# Patient Record
Sex: FEMALE | HISPANIC OR LATINO | Employment: UNEMPLOYED | URBAN - METROPOLITAN AREA
[De-identification: names, ages, dates, MRNs, and addresses within clinical notes are randomized per-mention and may not be internally consistent; named-entity substitution may affect disease eponyms.]

---

## 2017-02-12 ENCOUNTER — TRANSFERRED RECORDS (OUTPATIENT)
Dept: HEALTH INFORMATION MANAGEMENT | Facility: CLINIC | Age: 57
End: 2017-02-12

## 2017-02-12 ENCOUNTER — HOSPITAL ENCOUNTER (EMERGENCY)
Facility: CLINIC | Age: 57
Discharge: ANOTHER HOSPITAL | End: 2017-02-13
Attending: EMERGENCY MEDICINE | Admitting: EMERGENCY MEDICINE
Payer: COMMERCIAL

## 2017-02-12 ENCOUNTER — APPOINTMENT (OUTPATIENT)
Dept: GENERAL RADIOLOGY | Facility: CLINIC | Age: 57
End: 2017-02-12
Attending: EMERGENCY MEDICINE
Payer: COMMERCIAL

## 2017-02-12 DIAGNOSIS — R07.9 CHEST PAIN, UNSPECIFIED TYPE: ICD-10-CM

## 2017-02-12 DIAGNOSIS — R10.84 ABDOMINAL PAIN, GENERALIZED: ICD-10-CM

## 2017-02-12 DIAGNOSIS — R45.851 SUICIDAL IDEATION: ICD-10-CM

## 2017-02-12 LAB
ALBUMIN SERPL-MCNC: 3.7 G/DL (ref 3.4–5)
ALBUMIN UR-MCNC: NEGATIVE MG/DL
ALP SERPL-CCNC: 118 U/L (ref 40–150)
ALT SERPL W P-5'-P-CCNC: 39 U/L (ref 0–50)
AMPHETAMINES UR QL SCN: NORMAL
ANION GAP SERPL CALCULATED.3IONS-SCNC: 9 MMOL/L (ref 3–14)
APAP SERPL-MCNC: NORMAL MG/L (ref 10–20)
APPEARANCE UR: CLEAR
AST SERPL W P-5'-P-CCNC: 29 U/L (ref 0–45)
BARBITURATES UR QL: NORMAL
BASOPHILS # BLD AUTO: 0.1 10E9/L (ref 0–0.2)
BASOPHILS NFR BLD AUTO: 0.7 %
BENZODIAZ UR QL: NORMAL
BILIRUB SERPL-MCNC: 0.6 MG/DL (ref 0.2–1.3)
BILIRUB UR QL STRIP: NEGATIVE
BUN SERPL-MCNC: 8 MG/DL (ref 7–30)
CALCIUM SERPL-MCNC: 8.6 MG/DL (ref 8.5–10.1)
CANNABINOIDS UR QL SCN: NORMAL
CHLORIDE SERPL-SCNC: 106 MMOL/L (ref 94–109)
CO2 SERPL-SCNC: 24 MMOL/L (ref 20–32)
COCAINE UR QL: NORMAL
COLOR UR AUTO: YELLOW
CREAT SERPL-MCNC: 0.54 MG/DL (ref 0.52–1.04)
D DIMER PPP FEU-MCNC: 0.3 UG/ML FEU (ref 0–0.5)
DIFFERENTIAL METHOD BLD: NORMAL
EOSINOPHIL # BLD AUTO: 0.1 10E9/L (ref 0–0.7)
EOSINOPHIL NFR BLD AUTO: 1.2 %
ERYTHROCYTE [DISTWIDTH] IN BLOOD BY AUTOMATED COUNT: 13.4 % (ref 10–15)
ETHANOL SERPL-MCNC: <0.01 G/DL
GFR SERPL CREATININE-BSD FRML MDRD: NORMAL ML/MIN/1.7M2
GLUCOSE SERPL-MCNC: 97 MG/DL (ref 70–99)
GLUCOSE UR STRIP-MCNC: NEGATIVE MG/DL
HCT VFR BLD AUTO: 41.5 % (ref 35–47)
HGB BLD-MCNC: 13.5 G/DL (ref 11.7–15.7)
HGB UR QL STRIP: NEGATIVE
IMM GRANULOCYTES # BLD: 0 10E9/L (ref 0–0.4)
IMM GRANULOCYTES NFR BLD: 0.2 %
KETONES UR STRIP-MCNC: NEGATIVE MG/DL
LEUKOCYTE ESTERASE UR QL STRIP: NEGATIVE
LIPASE SERPL-CCNC: 163 U/L (ref 73–393)
LYMPHOCYTES # BLD AUTO: 2.7 10E9/L (ref 0.8–5.3)
LYMPHOCYTES NFR BLD AUTO: 29.5 %
MCH RBC QN AUTO: 28.6 PG (ref 26.5–33)
MCHC RBC AUTO-ENTMCNC: 32.5 G/DL (ref 31.5–36.5)
MCV RBC AUTO: 88 FL (ref 78–100)
MONOCYTES # BLD AUTO: 0.5 10E9/L (ref 0–1.3)
MONOCYTES NFR BLD AUTO: 5 %
MUCOUS THREADS #/AREA URNS LPF: PRESENT /LPF
NEUTROPHILS # BLD AUTO: 5.7 10E9/L (ref 1.6–8.3)
NEUTROPHILS NFR BLD AUTO: 63.4 %
NITRATE UR QL: NEGATIVE
NRBC # BLD AUTO: 0 10*3/UL
NRBC BLD AUTO-RTO: 0 /100
OPIATES UR QL SCN: NORMAL
PCP UR QL SCN: NORMAL
PH UR STRIP: 6.5 PH (ref 5–7)
PLATELET # BLD AUTO: 266 10E9/L (ref 150–450)
POTASSIUM SERPL-SCNC: 4 MMOL/L (ref 3.4–5.3)
PROT SERPL-MCNC: 7.9 G/DL (ref 6.8–8.8)
RBC # BLD AUTO: 4.72 10E12/L (ref 3.8–5.2)
RBC #/AREA URNS AUTO: 0 /HPF (ref 0–2)
SALICYLATES SERPL-MCNC: NORMAL MG/DL
SODIUM SERPL-SCNC: 139 MMOL/L (ref 133–144)
SP GR UR STRIP: 1.01 (ref 1–1.03)
SQUAMOUS #/AREA URNS AUTO: 1 /HPF (ref 0–1)
TRANS CELLS #/AREA URNS HPF: <1 /HPF (ref 0–1)
TROPONIN I SERPL-MCNC: NORMAL UG/L (ref 0–0.04)
URN SPEC COLLECT METH UR: ABNORMAL
UROBILINOGEN UR STRIP-MCNC: NORMAL MG/DL (ref 0–2)
WBC # BLD AUTO: 9 10E9/L (ref 4–11)
WBC #/AREA URNS AUTO: 1 /HPF (ref 0–2)

## 2017-02-12 PROCEDURE — 96360 HYDRATION IV INFUSION INIT: CPT

## 2017-02-12 PROCEDURE — 80320 DRUG SCREEN QUANTALCOHOLS: CPT | Mod: 59 | Performed by: EMERGENCY MEDICINE

## 2017-02-12 PROCEDURE — 84484 ASSAY OF TROPONIN QUANT: CPT | Performed by: EMERGENCY MEDICINE

## 2017-02-12 PROCEDURE — 93005 ELECTROCARDIOGRAM TRACING: CPT

## 2017-02-12 PROCEDURE — 80329 ANALGESICS NON-OPIOID 1 OR 2: CPT | Mod: 91 | Performed by: EMERGENCY MEDICINE

## 2017-02-12 PROCEDURE — 85379 FIBRIN DEGRADATION QUANT: CPT | Performed by: EMERGENCY MEDICINE

## 2017-02-12 PROCEDURE — 71020 XR CHEST 2 VW: CPT

## 2017-02-12 PROCEDURE — 80053 COMPREHEN METABOLIC PANEL: CPT | Performed by: EMERGENCY MEDICINE

## 2017-02-12 PROCEDURE — 83690 ASSAY OF LIPASE: CPT | Performed by: EMERGENCY MEDICINE

## 2017-02-12 PROCEDURE — 99285 EMERGENCY DEPT VISIT HI MDM: CPT | Mod: 25

## 2017-02-12 PROCEDURE — 96361 HYDRATE IV INFUSION ADD-ON: CPT

## 2017-02-12 PROCEDURE — 90791 PSYCH DIAGNOSTIC EVALUATION: CPT | Performed by: EMERGENCY MEDICINE

## 2017-02-12 PROCEDURE — 80307 DRUG TEST PRSMV CHEM ANLYZR: CPT | Performed by: EMERGENCY MEDICINE

## 2017-02-12 PROCEDURE — 85025 COMPLETE CBC W/AUTO DIFF WBC: CPT | Performed by: EMERGENCY MEDICINE

## 2017-02-12 PROCEDURE — 25000128 H RX IP 250 OP 636: Performed by: EMERGENCY MEDICINE

## 2017-02-12 PROCEDURE — 81001 URINALYSIS AUTO W/SCOPE: CPT | Performed by: EMERGENCY MEDICINE

## 2017-02-12 PROCEDURE — 80329 ANALGESICS NON-OPIOID 1 OR 2: CPT | Performed by: EMERGENCY MEDICINE

## 2017-02-12 RX ADMIN — SODIUM CHLORIDE 1000 ML: 9 INJECTION, SOLUTION INTRAVENOUS at 18:47

## 2017-02-12 NOTE — ED NOTES
Patient sent from [facility] clinic by EMS. Was seen for abdominal pain there last night and then spent the night in the clinic bathroom. Patient continues to have abdominal pain and is complaining of depression and suicidal thoughts to staff at clinic today. Patient is tearful. ABC Intact alert and no distress.   Patient is Colombian speaking.

## 2017-02-12 NOTE — ED NOTES
Bed: ED06  Expected date: 2/12/17  Expected time: 5:29 PM  Means of arrival: Ambulance  Comments:  BSV 1

## 2017-02-12 NOTE — ED PROVIDER NOTES
"  History     Chief Complaint:    Suicidal      HPI History limited secondary to a language barrier. A phone  was used.   Kayla Pierce is a 56 year old female who presents with suicidal ideation and chest/abdominal pain. The patient states that for the last 3 weeks she has had nausea, vomiting, diarrhea, diffuse abdominal pain, and chest pain. She states that she was evaluated at an  yesterday and was told that her work up was unremarkable and her symptoms were not serious. She states that she continued to have diarrhea so bad that she ended up spending the night in the  bathroom. She then presented back to the  for persistent symptoms and was sent to the ED for further evaluation. She also endorses increasing depression recently secondary to going through a divorce with her . She denies doing anything to actually harm herself, but states that she planned to fill her prescription for her anti-depressant yesterday and overdose. However, she was unable to fill the prescription. She also notes that she thought about taking a \"poison\" today, but denies actually ingesting anything. She notes that she has been off of her anti-depressant medication x 1 month because she can not afford it. She denies recent illness, fever, chills, shortness of breath, extremity pain, HI, alcohol use, or drug use. No other concerns or complaints at this time.           Allergies:  Amoxicillin  Ibuprofen      Medications:    Duloxetine hcl (cymbalta po)  Omeprazole (prilosec po)  Sertraline hcl (zoloft po)  Levothyroxine sodium po  Methocarbamol (robaxin) 500 mg tablet  Diclofenac (voltaren) 0.1 % ophthalmic solution  Acetaminophen (tylenol) 325 mg tablet  Ibuprofen (advil,motrin) 200 mg tablet    Past Medical History:    Hypothyroidism   Hearing loss    Past Surgical History:    Cholecystectomy    Family History:    History reviewed. No pertinent family history.     Social History:  Marital Status: "   Presents to the ED Alone  Tobacco Use: Never smoker  Alcohol Use: No  PCP: [facility]     Review of Systems   Constitutional: Negative for chills and fever.   HENT: Negative.    Eyes: Negative.    Respiratory: Negative for shortness of breath.    Cardiovascular: Positive for chest pain. Negative for leg swelling.   Gastrointestinal: Positive for abdominal pain, diarrhea, nausea and vomiting. Negative for blood in stool.   Endocrine: Negative.    Genitourinary: Negative.    Musculoskeletal: Negative.    Neurological: Negative.    Psychiatric/Behavioral: Positive for dysphoric mood and suicidal ideas.         Physical Exam   First Vitals:  BP: 134/74  Pulse: 98  Temp: 98.4  F (36.9  C)  Resp: 20  SpO2: 98 %    Physical Exam  Constitutional: The patient is oriented to person, place, and time. Alert and cooperative.  HENT:   Right Ear: External ear normal.   Left Ear: External ear normal.   Nose: Nose normal.   Mouth/Throat: Uvula is midline, oropharynx is clear and moist and mucous membranes are normal. No posterior oropharyngeal edema or erythema.   Eyes: Conjunctivae, EOM and lids are normal. Pupils are equal, round, and reactive to light.   Neck: Trachea normal. Normal range of motion. Neck supple.   Cardiovascular: Normal rate, regular rhythm, normal heart sounds, and intact distal pulses.    Pulmonary/Chest: Effort normal and breath sounds equal bilaterally. No crackles or wheezing.   Abdominal: Soft. Mild diffuse tenderness with palpation. No rebound and no guarding.   Musculoskeletal: Normal range of motion.  No extremity tenderness or edema.  Neurological: Alert and Oriented. Strength 5/5 in upper and lower extremities bilaterally. Sensation intact to light touch throughout.  Skin: Skin is dry. No rash noted.          Emergency Department Course   ECG:  @ 1834  Indication: chest pain  Vent. Rate 67 bpm. OH interval 146 ms. QRS duration 78 ms. QT/QTc 418/441 ms. P-R-T axis 8 -13 6.    Normal sinus rhythm. Normal ECG.   Read @ 1842 by Dr. Olivares.     Imaging:   X-ray Chest, PA and LAT:  Negative   Preliminary radiology read.    Radiographic findings were communicated with the patient who voiced understanding of the findings.    Laboratory:  UA: Clear yellow urine, mucous present otherwise WNL     Drug abuse screen 77 urine: Amphetamine negative, Barbiturates negative, Benzodiazapine's negative, Cannabinoid negative, Cocaine negative, Opiate negative, PCP negative.        CBC:  WBC 9.0, HGB 13.5, , otherwise WNL   CMP:  WNL (Creatinine 0.54)    Lipase: 163    (1830) Troponin I: <0.015     (1830) D-dimer: 0.3    (1830) Acetaminophen level: < 2    (1830) Salicylate level: <2     (1830) Blood alcohol level: < 0.01    Interventions:  (1847) Normal Saline, 1 liter, IV bolus      Emergency Department Course:  Nursing notes and vitals reviewed.  I performed an exam of the patient as documented above.   EKG was done, interpretation as above.  IV inserted.   Blood was drawn from the patient. This was sent for laboratory testing, findings above.  Urine sample was obtained and sent for laboratory analysis, findings above.   The patient was sent for a chest x-ray while in the emergency department, findings above.    I spoke with Timur from DEC regarding the patient.  Patient will be signed out to my partner Dr. Tan pending transfer to inpatient psychiatry.      Impression & Plan      Medical Decision Making:  Kayla Pierce is a 56 year old female with a history of hypothyroidism who presents to the ED for evaluation of suicidal thoughts and abdominal pain. Upon presentation to the ED, the patient is nontoxic appearing and her vital signs are within normal limits and stable. On exam, she is well appearing. She is alert, oriented, and neuro exam is nonfocal. Cardiopulmonary exam is unremarkable. On abdominal examination, the patient does endorse very mild diffuse tenderness to palpation,  there is no rebound or guarding. The rest of her exam is as mentioned above. With regard to the patient's abdominal and chest pain, she does note that these symptoms have been present for nearly 3 weeks. Per review of records, the patient was evaluated in the urgent care yesterday and did have imaging and labs performed. She did have a CT of the abdomen that demonstrated no evidence of an acute intraabdominal process. Labs were obtained and were overall unremarkable. EKG was obtained here today and demonstrates sinus rhythm. There are no concerning acute ischemic changes. Chest x-ray demonstrates no evidence of an acute cardiopulmomary process. Labs were obtained and are as mentioned above. Notably, her lab evaluation is unremarkable. With regard to the patient's chest pain, given the unremarkable chest x-ray, pneumonia and pneumothorax are unlikely. She is low risk for PE by WELL's criteria with a negative D-dimer. PE is less likely and further evaluation for this is not indicated at this at this time. Given the unremarkable EKG and negative troponin, despite greater than 6 hours of symptoms, this essentially rules out ACS. The patient's history and present are not consistent with aortic dissection, esophageal rupture, or pericarditis, therefore I feel that these diagnoses are less likely.     With regard to her mild diffuse abdominal pain, lab evaluation here is unremarkable. The patient did have a CT of her abdomen obtained yesterday that demonstrated no evidence of an acute intraabdominal process. Therefore I do not feel that this needs to be repeated at this time. With regard to the patients suicidal thoughts, an ingestion workup was performed. This was overall unremarkable. The patient denies doing anything recently to harm herself. Given that the patient does endorse suicidal thoughts, DEC was consulted and they did evaluate the patient. Per report from DEC, the patient's son was contacted and he did note  that over the last month the patient has had several episodes in which she has attempted to hang herself with a scarf. These symptoms have been triggered over recent divorce papers given to the patient by her . Overall, given that the patient does endorse suicidal thoughts, she was placed on an LENCHO and I do feel that inpatient management is indicated. DEC is in agreement with this assessment. The patient was signed out to my colleague, Dr. Tan, pending transfer to inpatient psychiatry. She is medically cleared at this time. She was stable/improved at the time of sign out.       Diagnosis:    ICD-10-CM    1. Suicidal ideation R45.851 UA with Microscopic     Drug abuse screen 77 urine   2. Abdominal pain, generalized R10.84    3. Chest pain, unspecified type R07.9      Disposition:  Sign out to Kailash Peñaloza, am serving as a scribe on 2/12/2017 at 6:27 PM to personally document services performed by Dr. Olivares based on my observations and the provider's statements to me.     2/12/2017   [facility] EMERGENCY DEPARTMENT       Britt Olivares MD  02/13/17 0120

## 2017-02-13 ENCOUNTER — HOSPITAL ENCOUNTER (INPATIENT)
Facility: CLINIC | Age: 57
LOS: 5 days | Discharge: HOME | DRG: 885 | End: 2017-02-18
Attending: PSYCHIATRY & NEUROLOGY | Admitting: PSYCHIATRY & NEUROLOGY
Payer: COMMERCIAL

## 2017-02-13 ENCOUNTER — OFFICE VISIT (OUTPATIENT)
Dept: INTERPRETER SERVICES | Facility: CLINIC | Age: 57
End: 2017-02-13

## 2017-02-13 VITALS
HEART RATE: 74 BPM | SYSTOLIC BLOOD PRESSURE: 114 MMHG | TEMPERATURE: 98.4 F | OXYGEN SATURATION: 100 % | RESPIRATION RATE: 18 BRPM | DIASTOLIC BLOOD PRESSURE: 72 MMHG

## 2017-02-13 DIAGNOSIS — E03.9 HYPOTHYROIDISM, UNSPECIFIED TYPE: ICD-10-CM

## 2017-02-13 DIAGNOSIS — F33.41 RECURRENT MAJOR DEPRESSIVE DISORDER, IN PARTIAL REMISSION (H): Primary | ICD-10-CM

## 2017-02-13 PROBLEM — F32.A DEPRESSION: Status: ACTIVE | Noted: 2017-02-13

## 2017-02-13 LAB
C DIFF TOX B STL QL: NORMAL
INTERPRETATION ECG - MUSE: NORMAL
SPECIMEN SOURCE: NORMAL

## 2017-02-13 PROCEDURE — 25000128 H RX IP 250 OP 636: Performed by: EMERGENCY MEDICINE

## 2017-02-13 PROCEDURE — 12400007 ZZH R&B MH INTERMEDIATE UMMC

## 2017-02-13 PROCEDURE — 25000132 ZZH RX MED GY IP 250 OP 250 PS 637: Performed by: EMERGENCY MEDICINE

## 2017-02-13 PROCEDURE — T1013 SIGN LANG/ORAL INTERPRETER: HCPCS | Mod: U3

## 2017-02-13 PROCEDURE — 25000132 ZZH RX MED GY IP 250 OP 250 PS 637: Performed by: CLINICAL NURSE SPECIALIST

## 2017-02-13 PROCEDURE — 96361 HYDRATE IV INFUSION ADD-ON: CPT

## 2017-02-13 PROCEDURE — 87493 C DIFF AMPLIFIED PROBE: CPT | Performed by: EMERGENCY MEDICINE

## 2017-02-13 RX ORDER — TRAZODONE HYDROCHLORIDE 50 MG/1
50 TABLET, FILM COATED ORAL
Status: DISCONTINUED | OUTPATIENT
Start: 2017-02-13 | End: 2017-02-18 | Stop reason: HOSPADM

## 2017-02-13 RX ORDER — ACETAMINOPHEN 325 MG/1
650 TABLET ORAL EVERY 4 HOURS PRN
Status: DISCONTINUED | OUTPATIENT
Start: 2017-02-13 | End: 2017-02-18 | Stop reason: HOSPADM

## 2017-02-13 RX ORDER — ALUMINA, MAGNESIA, AND SIMETHICONE 2400; 2400; 240 MG/30ML; MG/30ML; MG/30ML
30 SUSPENSION ORAL EVERY 4 HOURS PRN
Status: DISCONTINUED | OUTPATIENT
Start: 2017-02-13 | End: 2017-02-18 | Stop reason: HOSPADM

## 2017-02-13 RX ORDER — OLANZAPINE 10 MG/2ML
10 INJECTION, POWDER, FOR SOLUTION INTRAMUSCULAR
Status: DISCONTINUED | OUTPATIENT
Start: 2017-02-13 | End: 2017-02-18 | Stop reason: HOSPADM

## 2017-02-13 RX ORDER — SODIUM CHLORIDE 9 MG/ML
1000 INJECTION, SOLUTION INTRAVENOUS CONTINUOUS
Status: DISCONTINUED | OUTPATIENT
Start: 2017-02-13 | End: 2017-02-13 | Stop reason: HOSPADM

## 2017-02-13 RX ORDER — HYDROXYZINE HYDROCHLORIDE 25 MG/1
25-50 TABLET, FILM COATED ORAL EVERY 4 HOURS PRN
Status: DISCONTINUED | OUTPATIENT
Start: 2017-02-13 | End: 2017-02-18 | Stop reason: HOSPADM

## 2017-02-13 RX ORDER — OLANZAPINE 10 MG/1
10 TABLET ORAL
Status: DISCONTINUED | OUTPATIENT
Start: 2017-02-13 | End: 2017-02-18 | Stop reason: HOSPADM

## 2017-02-13 RX ORDER — ACETAMINOPHEN 325 MG/1
650 TABLET ORAL ONCE
Status: COMPLETED | OUTPATIENT
Start: 2017-02-13 | End: 2017-02-13

## 2017-02-13 RX ORDER — BISACODYL 10 MG
10 SUPPOSITORY, RECTAL RECTAL DAILY PRN
Status: DISCONTINUED | OUTPATIENT
Start: 2017-02-13 | End: 2017-02-18 | Stop reason: HOSPADM

## 2017-02-13 RX ADMIN — ACETAMINOPHEN 650 MG: 325 TABLET, FILM COATED ORAL at 22:40

## 2017-02-13 RX ADMIN — ACETAMINOPHEN 650 MG: 325 TABLET, FILM COATED ORAL at 15:31

## 2017-02-13 RX ADMIN — SODIUM CHLORIDE 1000 ML: 9 INJECTION, SOLUTION INTRAVENOUS at 09:36

## 2017-02-13 ASSESSMENT — ACTIVITIES OF DAILY LIVING (ADL)
DRESS: 0-->INDEPENDENT
GROOMING: INDEPENDENT
TRANSFERRING: 0-->INDEPENDENT
DRESS: INDEPENDENT
ORAL_HYGIENE: INDEPENDENT
RETIRED_EATING: 0-->INDEPENDENT
SWALLOWING: 0-->SWALLOWS FOODS/LIQUIDS WITHOUT DIFFICULTY
BATHING: 0-->INDEPENDENT
TRANSFERRING: 0-->INDEPENDENT
RETIRED_COMMUNICATION: 0-->UNDERSTANDS/COMMUNICATES WITHOUT DIFFICULTY
TOILETING: 0-->INDEPENDENT
TOILETING: 0-->INDEPENDENT
LAUNDRY: UNABLE TO COMPLETE
COMMUNICATION: 0-->UNDERSTANDS/COMMUNICATES WITHOUT DIFFICULTY
BATHING: 0-->INDEPENDENT
EATING: 0-->INDEPENDENT
SWALLOWING: 0-->SWALLOWS FOODS/LIQUIDS WITHOUT DIFFICULTY
COGNITION: 0 - NO COGNITION ISSUES REPORTED
CHANGE_IN_FUNCTIONAL_STATUS_SINCE_ONSET_OF_CURRENT_ILLNESS/INJURY: NO
FALL_HISTORY_WITHIN_LAST_SIX_MONTHS: NO
DRESS: 0-->INDEPENDENT
CURRENT_FUNCTIONAL_LEVEL_COMMENT: GOOD
AMBULATION: 0-->INDEPENDENT
AMBULATION: 0-->INDEPENDENT
PRIOR_FUNCTIONAL_LEVEL_COMMENT: GOOD

## 2017-02-13 ASSESSMENT — ENCOUNTER SYMPTOMS
DYSPHORIC MOOD: 1
EYES NEGATIVE: 1
NAUSEA: 1
DIARRHEA: 1
MUSCULOSKELETAL NEGATIVE: 1
SHORTNESS OF BREATH: 0
CHILLS: 0
VOMITING: 1
BLOOD IN STOOL: 0
ABDOMINAL PAIN: 1
FEVER: 0
ENDOCRINE NEGATIVE: 1
NEUROLOGICAL NEGATIVE: 1

## 2017-02-13 NOTE — ED NOTES
Patient became hypotensive, notified Dr. Tan. 1L NS ordered and administered, patient responded well. BP in normal range.

## 2017-02-13 NOTE — ED PROVIDER NOTES
Sign-out Note    Received this patient in sign-out from Dr. Tan at 8:48 AM.  Please refer to earlier documentation detailing presenting complaints, evaluation, and ED course.  In brief, patient is being seen in the ER for suicidal ideation as well as chest/abdominal pain.  3 wk hx of N,V,D, diffuse abd pain, and chest pain.  Denied completing suicide attempt, though sounds of OD on anti-depressant.  ED work-up included Labs, and CXR, which returned unremarkable.  During morning, BP were noted to be low in 70s, though patient asymptomatic.  Provided IVF.  BP improved.    Recommendations from previous provider: Awaiting transfer to [facility].  Monitor BP    ED course under my care:  Patient re-evaluated.  Resting comfortably on gurney.  Tolerating PO.  Abdominal exam reassuring.  Cardiopulmonary exam unremarkable.  BP have remained stable.  [place] requesting C-diff testing prior to transfer.  Have informed them this will not return in timely fashion, and after discussion with our lab, confirmed this is a send-out test.  Multiple attempts to contact [place] to arrange transfer.  Awaiting to hear back.    Disposition: Sign-out to Dr. Jeronimo pending transfer to [place] and ongoing monitoring.           Isidro Polk MD  02/13/17 3397

## 2017-02-13 NOTE — IP AVS SNAPSHOT
[facility]    [address]    [place] [address]    Phone:  [phone]                                       After Visit Summary   2/13/2017    Kayla Pierce    MRN: [id number]           After Visit Summary Signature Page     I have received my discharge instructions, and my questions have been answered. I have discussed any challenges I see with this plan with the nurse or doctor.    ..........................................................................................................................................  Patient/Patient Representative Signature      ..........................................................................................................................................  Patient Representative Print Name and Relationship to Patient    ..................................................               ................................................  Date                                            Time    ..........................................................................................................................................  Reviewed by Signature/Title    ...................................................              ..............................................  Date                                                            Time

## 2017-02-14 ENCOUNTER — OFFICE VISIT (OUTPATIENT)
Dept: INTERPRETER SERVICES | Facility: CLINIC | Age: 57
End: 2017-02-14

## 2017-02-14 PROCEDURE — 25000132 ZZH RX MED GY IP 250 OP 250 PS 637: Performed by: NURSE PRACTITIONER

## 2017-02-14 PROCEDURE — 25000132 ZZH RX MED GY IP 250 OP 250 PS 637: Performed by: PHYSICIAN ASSISTANT

## 2017-02-14 PROCEDURE — 12400007 ZZH R&B MH INTERMEDIATE UMMC

## 2017-02-14 PROCEDURE — 99222 1ST HOSP IP/OBS MODERATE 55: CPT | Mod: AI | Performed by: NURSE PRACTITIONER

## 2017-02-14 PROCEDURE — 99221 1ST HOSP IP/OBS SF/LOW 40: CPT | Performed by: PHYSICIAN ASSISTANT

## 2017-02-14 PROCEDURE — 25000132 ZZH RX MED GY IP 250 OP 250 PS 637: Performed by: CLINICAL NURSE SPECIALIST

## 2017-02-14 PROCEDURE — T1013 SIGN LANG/ORAL INTERPRETER: HCPCS | Mod: U3

## 2017-02-14 RX ORDER — METHOCARBAMOL 500 MG/1
500 TABLET, FILM COATED ORAL 4 TIMES DAILY PRN
Status: DISCONTINUED | OUTPATIENT
Start: 2017-02-14 | End: 2017-02-18 | Stop reason: HOSPADM

## 2017-02-14 RX ORDER — SERTRALINE HYDROCHLORIDE 25 MG/1
25 TABLET, FILM COATED ORAL DAILY
Status: DISCONTINUED | OUTPATIENT
Start: 2017-02-14 | End: 2017-02-14

## 2017-02-14 RX ORDER — DULOXETIN HYDROCHLORIDE 20 MG/1
20 CAPSULE, DELAYED RELEASE ORAL DAILY
Status: DISCONTINUED | OUTPATIENT
Start: 2017-02-14 | End: 2017-02-15

## 2017-02-14 RX ORDER — AMOXICILLIN 250 MG
1 CAPSULE ORAL AT BEDTIME
Status: DISCONTINUED | OUTPATIENT
Start: 2017-02-14 | End: 2017-02-18 | Stop reason: HOSPADM

## 2017-02-14 RX ORDER — ONDANSETRON 4 MG/1
4 TABLET, ORALLY DISINTEGRATING ORAL EVERY 6 HOURS PRN
Status: DISCONTINUED | OUTPATIENT
Start: 2017-02-14 | End: 2017-02-18 | Stop reason: HOSPADM

## 2017-02-14 RX ADMIN — ACETAMINOPHEN 650 MG: 325 TABLET, FILM COATED ORAL at 12:15

## 2017-02-14 RX ADMIN — DULOXETINE 20 MG: 20 CAPSULE, DELAYED RELEASE PELLETS ORAL at 13:53

## 2017-02-14 RX ADMIN — ACETAMINOPHEN 650 MG: 325 TABLET, FILM COATED ORAL at 02:51

## 2017-02-14 RX ADMIN — RANITIDINE HYDROCHLORIDE 150 MG: 150 TABLET, FILM COATED ORAL at 21:23

## 2017-02-14 RX ADMIN — SENNOSIDES AND DOCUSATE SODIUM 1 TABLET: 8.6; 5 TABLET ORAL at 21:23

## 2017-02-14 RX ADMIN — OMEPRAZOLE 40 MG: 20 CAPSULE, DELAYED RELEASE ORAL at 12:12

## 2017-02-14 ASSESSMENT — ACTIVITIES OF DAILY LIVING (ADL)
DRESS: INDEPENDENT
LAUNDRY: UNABLE TO COMPLETE
ORAL_HYGIENE: INDEPENDENT
ORAL_HYGIENE: INDEPENDENT
LAUNDRY: UNABLE TO COMPLETE
GROOMING: INDEPENDENT
DRESS: INDEPENDENT
GROOMING: INDEPENDENT

## 2017-02-14 NOTE — PROGRESS NOTES
Verified the lack of medications with [facility] [phone].   Has not picked up Cymbalta or eye drops ever, no Robaxin listed  Levothyroxine 50 mcg last filled 12/9  Zoloft 100 mg filled 9/13/16, 50 mg 12/9/16  Prilosec is new prescription.

## 2017-02-14 NOTE — PROGRESS NOTES
Writer LM for sonXavier [phone] (EZEKIEL in chart) for collateral information based on pt being poor historian.

## 2017-02-14 NOTE — PROGRESS NOTES
Patient woke up with a chief complaint of headache and requested for her pain pill. Tylenol 650 mg. given @ 0251 PRN for pain . She also requested for cranberry juice. Patient verbalized pain relief a few minutes after. Was not able to go back to sleep at once, spent the time reading a book.  Slept 4.5 hours.
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
## 2017-02-14 NOTE — PROGRESS NOTES
02/13/17 2035   Patient Belongings   Patient Belongings other (see comments)   Disposition of Belongings see note   Belongings Search Yes   ADMISSION:one plastic bag for patient locker.  One vomit bag, one cell phone and , one bible, assorted paperwork, one set ear buds.   I am responsible for any personal items that are not sent to the safe or pharmacy. [facility] is not responsible for loss, theft or damage of any property in my possession.    Patient Signature _____________________ Date/Time _____________________    Staff Signature _______________________ Date/Time _____________________    2nd Staff person, if patient is unable/unwilling to sign  ___________________________________ Date/Time _____________________  DISCHARGE:  All personal items have been returned to me.    Patient Signature _____________________ Date/Time _____________________    Staff Signature _______________________ Date/Time _____________________
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
## 2017-02-14 NOTE — PLAN OF CARE
Problem: General Plan of Care (Inpatient Behavioral)  Goal: Individualization/Patient Specific Goal (IP Behavioral)  The patient and/or their representative will achieve their patient-specific goals related to the plan of care.    The patient-specific goals include:   Illness Management Recovery model: Objectives    Patient will identify reason(s) for hospitalization from their perspective.  Patient will identify a minimum of three goals for discharge.  Patient will identify a minimum of three triggers that may increase their symptoms.  Patient will identify a minimum of three coping skills they can do to stay well.   Patient will identify their support system to demonstrate readiness for discharge.   Reasons you are  In the hospitial:   1) Urgent care staff referred for evaluation.  2) Off anti depressants for past month.      Goals for discharge:  1) Restart medications.   2) Follow up with outpatient providers.
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
## 2017-02-14 NOTE — PROGRESS NOTES
1215 PRN tylenol 650 mg for headache  1255 PRN zofran 4 mg for N/V Pt reported having am emesis, not observed.   Started on prilosec 40 mg per order.

## 2017-02-14 NOTE — PLAN OF CARE
Problem: General Plan of Care (Inpatient Behavioral)  Goal: Team Discussion  Team Plan:   BEHAVIORAL TEAM DISCUSSION     Continued Stay Criteria/Rationale: SI.  Plan: Psychiatric Assessment. Medication Management. Therapeutic Mileu. Individual and group support.  Participants: 3B Osman Shisw, Yojana Matthews, OT, David Haji DNP, Bel Zapata RN.      Summary/Recommendation: Pt with Hx of MDD was referred [facility] due to pt reports of SI. Was on 72 Hr hold but signed in voluntarily today. Interpretor will be on unit everyday at 10:00am.  Pt has current stressor- pending divorce and financial concerns. Family report 3-4 SA in past couple months in front of family (ODs & hanging) after being served with divorce papers. Pt recently referred to therapist. PCP manages medication. 1st  admission.   Medical/Physical: See H & P.     Progress: Initial.

## 2017-02-14 NOTE — PROGRESS NOTES
Pt has not attended OT groups yet. She will be encouraged to attend groups and be provided a Self Assessment form.  OT staff will explain the value of OT, including them in their treatment plan and offer options to meet their needs and identify goals.

## 2017-02-15 ENCOUNTER — OFFICE VISIT (OUTPATIENT)
Dept: INTERPRETER SERVICES | Facility: CLINIC | Age: 57
End: 2017-02-15

## 2017-02-15 LAB
ALBUMIN SERPL-MCNC: 3.1 G/DL (ref 3.4–5)
ALP SERPL-CCNC: 88 U/L (ref 40–150)
ALT SERPL W P-5'-P-CCNC: 25 U/L (ref 0–50)
ANION GAP SERPL CALCULATED.3IONS-SCNC: 7 MMOL/L (ref 3–14)
AST SERPL W P-5'-P-CCNC: 18 U/L (ref 0–45)
BILIRUB SERPL-MCNC: 0.3 MG/DL (ref 0.2–1.3)
BUN SERPL-MCNC: 10 MG/DL (ref 7–30)
CALCIUM SERPL-MCNC: 8.6 MG/DL (ref 8.5–10.1)
CHLORIDE SERPL-SCNC: 107 MMOL/L (ref 94–109)
CO2 SERPL-SCNC: 29 MMOL/L (ref 20–32)
CREAT SERPL-MCNC: 0.57 MG/DL (ref 0.52–1.04)
ERYTHROCYTE [DISTWIDTH] IN BLOOD BY AUTOMATED COUNT: 13.5 % (ref 10–15)
GFR SERPL CREATININE-BSD FRML MDRD: ABNORMAL ML/MIN/1.7M2
GLUCOSE SERPL-MCNC: 89 MG/DL (ref 70–99)
HCT VFR BLD AUTO: 37.1 % (ref 35–47)
HGB BLD-MCNC: 12 G/DL (ref 11.7–15.7)
MCH RBC QN AUTO: 28.6 PG (ref 26.5–33)
MCHC RBC AUTO-ENTMCNC: 32.3 G/DL (ref 31.5–36.5)
MCV RBC AUTO: 88 FL (ref 78–100)
PLATELET # BLD AUTO: 225 10E9/L (ref 150–450)
POTASSIUM SERPL-SCNC: 3.9 MMOL/L (ref 3.4–5.3)
PROT SERPL-MCNC: 6.5 G/DL (ref 6.8–8.8)
RBC # BLD AUTO: 4.2 10E12/L (ref 3.8–5.2)
SODIUM SERPL-SCNC: 143 MMOL/L (ref 133–144)
T4 FREE SERPL-MCNC: 1.02 NG/DL (ref 0.76–1.46)
TSH SERPL DL<=0.05 MIU/L-ACNC: 8.32 MU/L (ref 0.4–4)
WBC # BLD AUTO: 8.9 10E9/L (ref 4–11)

## 2017-02-15 PROCEDURE — 25000132 ZZH RX MED GY IP 250 OP 250 PS 637: Performed by: NURSE PRACTITIONER

## 2017-02-15 PROCEDURE — 84439 ASSAY OF FREE THYROXINE: CPT | Performed by: PHYSICIAN ASSISTANT

## 2017-02-15 PROCEDURE — 80053 COMPREHEN METABOLIC PANEL: CPT | Performed by: PHYSICIAN ASSISTANT

## 2017-02-15 PROCEDURE — 84443 ASSAY THYROID STIM HORMONE: CPT | Performed by: PHYSICIAN ASSISTANT

## 2017-02-15 PROCEDURE — 99232 SBSQ HOSP IP/OBS MODERATE 35: CPT | Performed by: NURSE PRACTITIONER

## 2017-02-15 PROCEDURE — 36415 COLL VENOUS BLD VENIPUNCTURE: CPT | Performed by: PHYSICIAN ASSISTANT

## 2017-02-15 PROCEDURE — 12400002 ZZH R&B MH SENIOR/ADOLESCENT

## 2017-02-15 PROCEDURE — 25000132 ZZH RX MED GY IP 250 OP 250 PS 637: Performed by: CLINICAL NURSE SPECIALIST

## 2017-02-15 PROCEDURE — T1013 SIGN LANG/ORAL INTERPRETER: HCPCS | Mod: U3

## 2017-02-15 PROCEDURE — 85027 COMPLETE CBC AUTOMATED: CPT | Performed by: PHYSICIAN ASSISTANT

## 2017-02-15 PROCEDURE — 25000132 ZZH RX MED GY IP 250 OP 250 PS 637: Performed by: PHYSICIAN ASSISTANT

## 2017-02-15 RX ORDER — DULOXETIN HYDROCHLORIDE 20 MG/1
20 CAPSULE, DELAYED RELEASE ORAL ONCE
Status: COMPLETED | OUTPATIENT
Start: 2017-02-15 | End: 2017-02-15

## 2017-02-15 RX ORDER — DULOXETIN HYDROCHLORIDE 20 MG/1
40 CAPSULE, DELAYED RELEASE ORAL DAILY
Status: DISCONTINUED | OUTPATIENT
Start: 2017-02-16 | End: 2017-02-16

## 2017-02-15 RX ORDER — MIRTAZAPINE 15 MG/1
15 TABLET, FILM COATED ORAL AT BEDTIME
Status: DISCONTINUED | OUTPATIENT
Start: 2017-02-15 | End: 2017-02-18 | Stop reason: HOSPADM

## 2017-02-15 RX ORDER — LEVOTHYROXINE SODIUM 50 UG/1
50 TABLET ORAL
Status: DISCONTINUED | OUTPATIENT
Start: 2017-02-16 | End: 2017-02-18 | Stop reason: HOSPADM

## 2017-02-15 RX ADMIN — DULOXETINE 20 MG: 20 CAPSULE, DELAYED RELEASE PELLETS ORAL at 10:17

## 2017-02-15 RX ADMIN — MIRTAZAPINE 15 MG: 15 TABLET, FILM COATED ORAL at 20:43

## 2017-02-15 RX ADMIN — ACETAMINOPHEN 650 MG: 325 TABLET, FILM COATED ORAL at 01:10

## 2017-02-15 RX ADMIN — OMEPRAZOLE 20 MG: 20 CAPSULE, DELAYED RELEASE ORAL at 06:53

## 2017-02-15 RX ADMIN — RANITIDINE HYDROCHLORIDE 150 MG: 150 TABLET, FILM COATED ORAL at 20:44

## 2017-02-15 RX ADMIN — ACETAMINOPHEN 650 MG: 325 TABLET, FILM COATED ORAL at 17:39

## 2017-02-15 RX ADMIN — DULOXETINE 20 MG: 20 CAPSULE, DELAYED RELEASE PELLETS ORAL at 17:39

## 2017-02-15 RX ADMIN — SENNOSIDES AND DOCUSATE SODIUM 1 TABLET: 8.6; 5 TABLET ORAL at 20:43

## 2017-02-15 RX ADMIN — ACETAMINOPHEN 650 MG: 325 TABLET, FILM COATED ORAL at 12:03

## 2017-02-15 ASSESSMENT — ACTIVITIES OF DAILY LIVING (ADL)
ORAL_HYGIENE: INDEPENDENT
GROOMING: SHOWER
DRESS: SCRUBS (BEHAVIORAL HEALTH);INDEPENDENT
GROOMING: INDEPENDENT

## 2017-02-15 NOTE — PROGRESS NOTES
"Writer spoke to Xavier, [phone], pts son (EZEKIEL in chart). Xavier provided the follow collateral information. Pt \"has been doing little things lately like locking herself in the bathroom, laying under water in bath tub\". Xavier described pt  tieing scarfs together and trying to hang herself with them under the stair well. He said she \"never does anything when people are not home but does it when people are around\". Xavier believes  \"attention seeking an that is why she stayed in urgent care bathroom overnight\".  Pt has also been throwing up food after eating dinner for the past 2 months. Pt told Xavier she has stomach problems but urgent care didn't find any issues. Pt has also been \"taking diet pills and diet remedies\". Pt goes to bed around 2:00- 3:00am every night and sleeps until Xavier comes home from work. Xavier said pt likes to \"change her stories or lie\" and \"denies things that have happend\". Xavier said pt has been like this since he was a child and can remember.   Writer asked about pt claim to have been watching her grandson daily. Xavier said he only has custody of chil 3 days per week and pt does watch him sometime. Writer encouraged him to find another  option as pt judgement is impaired. Xavier agreed and said his son's aunt is watching him now and will continue to watch him when needed. Xavier said MD in ED also recommended pt not watch child.   Writer discussed discharge plan as pt is requesting to discharge \"sometime soon\", denying any SI and being her first MH admission  Attending plan to restart medication and discharge in a couple days. Writer recommended intensive therapy program. Xavier said  He has talked to pt in the past but she has always refused to go to therapy.   Writer discussed crisis resources should be utilized when pt makes suicidal comment and/ or gestures after discharge.   "

## 2017-02-15 NOTE — PLAN OF CARE
"Problem: General Plan of Care (Inpatient Behavioral)  Goal: Individualization/Patient Specific Goal (IP Behavioral)  The patient and/or their representative will achieve their patient-specific goals related to the plan of care.    The patient-specific goals include:   Illness Management Recovery model: Objectives    Patient will identify reason(s) for hospitalization from their perspective.  Patient will identify a minimum of three goals for discharge.  Patient will identify a minimum of three triggers that may increase their symptoms.  Patient will identify a minimum of three coping skills they can do to stay well.   Patient will identify their support system to demonstrate readiness for discharge.   Illness Management Recovery model:  Wellness Strategies.     Patient identified the following actions/activities they can do to stay well.     1. \"Drink lots of water and eat vegetables\".  2. \"Exercises\"  3. \"To look clean and beautiful\"          "

## 2017-02-15 NOTE — PROGRESS NOTES
[facility], [place]   Psychiatric Progress Note        Interim History:   The patient's care was discussed with the treatment team during the daily team meeting and/or staff's chart notes were reviewed.  Staff report patient spent most of the day yesterday in her room. Her affect was bright on approach. She took a shower independently and ate 100% of her meals. Patient continues to complains of pain mostly headache but also neck and back pain. She slept only 4 hours last night due to having a headache. She rated her depression as 7/10 (10 is the worst), anxiety as 5. Denied SI.     Met with the patient and . Patient continues to be vague when it comes to her mental health issues. She is mostly concern with her pain problems. Patient is a poor historian. She is not giving straight answers to most questions. States she feels depressed but can't explain what this means. Continues to associate her physical health with the depression. She mentioned the divorce and how it is affecting her but appears that she is accepting it. States her appetite and energy are good, she is sleeping when not in pain. Appear to have some racing thoughts and excessive worries but states she is a worrier by nature. She admits of trying to commit suicide but again was very vague about the reasons and the means. We discuss increasing Cymbalta today and starting Remeron for sleep. Patient is in agreement. Inquiry about discharge by the end of the week. Denies SI, hallucinations, delusions. Denies side effects of the medications.          Medications:       [START ON 2/16/2017] influenza quadrivalent (PF) vacc age 3 yrs and older  0.5 mL Intramuscular Prior to discharge     [START ON 2/16/2017] levothyroxine  50 mcg Oral QAM AC     senna-docusate  1 tablet Oral At Bedtime     DULoxetine  20 mg Oral Daily     omeprazole  20 mg Oral QAM AC     ranitidine  150 mg Oral At Bedtime          Allergies:      Allergies   Allergen Reactions     Amoxicillin      Ibuprofen           Labs:     Recent Results (from the past 672 hour(s))   CBC + differential    Collection Time: 02/12/17  6:30 PM   Result Value Ref Range    WBC 9.0 4.0 - 11.0 10e9/L    RBC Count 4.72 3.8 - 5.2 10e12/L    Hemoglobin 13.5 11.7 - 15.7 g/dL    Hematocrit 41.5 35.0 - 47.0 %    MCV 88 78 - 100 fl    MCH 28.6 26.5 - 33.0 pg    MCHC 32.5 31.5 - 36.5 g/dL    RDW 13.4 10.0 - 15.0 %    Platelet Count 266 150 - 450 10e9/L    Diff Method Automated Method     % Neutrophils 63.4 %    % Lymphocytes 29.5 %    % Monocytes 5.0 %    % Eosinophils 1.2 %    % Basophils 0.7 %    % Immature Granulocytes 0.2 %    Nucleated RBCs 0 0 /100    Absolute Neutrophil 5.7 1.6 - 8.3 10e9/L    Absolute Lymphocytes 2.7 0.8 - 5.3 10e9/L    Absolute Monocytes 0.5 0.0 - 1.3 10e9/L    Absolute Eosinophils 0.1 0.0 - 0.7 10e9/L    Absolute Basophils 0.1 0.0 - 0.2 10e9/L    Abs Immature Granulocytes 0.0 0 - 0.4 10e9/L    Absolute Nucleated RBC 0.0    Comprehensive metabolic panel    Collection Time: 02/12/17  6:30 PM   Result Value Ref Range    Sodium 139 133 - 144 mmol/L    Potassium 4.0 3.4 - 5.3 mmol/L    Chloride 106 94 - 109 mmol/L    Carbon Dioxide 24 20 - 32 mmol/L    Anion Gap 9 3 - 14 mmol/L    Glucose 97 70 - 99 mg/dL    Urea Nitrogen 8 7 - 30 mg/dL    Creatinine 0.54 0.52 - 1.04 mg/dL    GFR Estimate >90  Non  GFR Calc   >60 mL/min/1.7m2    GFR Estimate If Black >90   GFR Calc   >60 mL/min/1.7m2    Calcium 8.6 8.5 - 10.1 mg/dL    Bilirubin Total 0.6 0.2 - 1.3 mg/dL    Albumin 3.7 3.4 - 5.0 g/dL    Protein Total 7.9 6.8 - 8.8 g/dL    Alkaline Phosphatase 118 40 - 150 U/L    ALT 39 0 - 50 U/L    AST 29 0 - 45 U/L   Lipase    Collection Time: 02/12/17  6:30 PM   Result Value Ref Range    Lipase 163 73 - 393 U/L   Troponin I (now)    Collection Time: 02/12/17  6:30 PM   Result Value Ref Range    Troponin I ES  0.000 - 0.045 ug/L     <0.015  The  99th percentile for upper reference range is 0.045 ug/L.  Troponin values in   the range of 0.045 - 0.120 ug/L may be associated with risks of adverse   clinical events.     D dimer quantitative    Collection Time: 02/12/17  6:30 PM   Result Value Ref Range    D Dimer 0.3 0.0 - 0.50 ug/ml FEU   Acetaminophen level    Collection Time: 02/12/17  6:30 PM   Result Value Ref Range    Acetaminophen Level <2  Therapeutic range: 10-20 mg/L   mg/L   Salicylate level    Collection Time: 02/12/17  6:30 PM   Result Value Ref Range    Salicylate Level  mg/dL     <2  Therapeutic:        <20   Anti inflammatory:  15-30     Alcohol level blood    Collection Time: 02/12/17  6:30 PM   Result Value Ref Range    Ethanol g/dL <0.01 <0.01 g/dL   EKG 12 lead    Collection Time: 02/12/17  6:34 PM   Result Value Ref Range    Interpretation ECG Click View Image link to view waveform and result    UA with Microscopic    Collection Time: 02/12/17  7:52 PM   Result Value Ref Range    Color Urine Yellow     Appearance Urine Clear     Glucose Urine Negative NEG mg/dL    Bilirubin Urine Negative NEG    Ketones Urine Negative NEG mg/dL    Specific Gravity Urine 1.007 1.003 - 1.035    Blood Urine Negative NEG    pH Urine 6.5 5.0 - 7.0 pH    Protein Albumin Urine Negative NEG mg/dL    Urobilinogen mg/dL Normal 0.0 - 2.0 mg/dL    Nitrite Urine Negative NEG    Leukocyte Esterase Urine Negative NEG    Source Midstream Urine     WBC Urine 1 0 - 2 /HPF    RBC Urine 0 0 - 2 /HPF    Squamous Epithelial /HPF Urine 1 0 - 1 /HPF    Transitional Epi <1 0 - 1 /HPF    Mucous Urine Present (A) NEG /LPF   Drug abuse screen 77 urine    Collection Time: 02/12/17  7:52 PM   Result Value Ref Range    Amphetamine Qual Urine  NEG     Negative   Cutoff for a negative amphetamine is 500 ng/mL or less.      Barbiturates Qual Urine  NEG     Negative   Cutoff for a negative barbiturate is 200 ng/mL or less.      Benzodiazepine Qual Urine  NEG     Negative   Cutoff for a  negative benzodiazepine is 200 ng/mL or less.      Cannabinoids Qual Urine  NEG     Negative   Cutoff for a negative cannabinoid is 50 ng/mL or less.      Cocaine Qual Urine  NEG     Negative   Cutoff for a negative cocaine is 300 ng/mL or less.      Opiates Qualitative Urine  NEG     Negative   Cutoff for a negative opiate is 300 ng/mL or less.      PCP Qual Urine  NEG     Negative   Cutoff for a negative PCP is 25 ng/mL or less.     Clostridium difficile toxin B PCR    Collection Time: 02/13/17  1:20 PM   Result Value Ref Range    Specimen Description Feces     C Diff Toxin B PCR  NEG     Negative  Negative: Clostridium difficile target DNA sequences NOT detected, presumed   negative for Clostridium difficile toxin B or the number of bacteria present   may be below the limit of detection for the test.   FDA approved assay performed using [employer] real-time PCR.   A negative result does not exclude actual disease due to Clostridium difficile   and may be due to improper collection, handling and storage of the specimen or   the number of organisms in the specimen is below the detection limit of the   assay.     TSH    Collection Time: 02/15/17  8:52 AM   Result Value Ref Range    TSH 8.32 (H) 0.40 - 4.00 mU/L   CBC with platelets    Collection Time: 02/15/17  8:52 AM   Result Value Ref Range    WBC 8.9 4.0 - 11.0 10e9/L    RBC Count 4.20 3.8 - 5.2 10e12/L    Hemoglobin 12.0 11.7 - 15.7 g/dL    Hematocrit 37.1 35.0 - 47.0 %    MCV 88 78 - 100 fl    MCH 28.6 26.5 - 33.0 pg    MCHC 32.3 31.5 - 36.5 g/dL    RDW 13.5 10.0 - 15.0 %    Platelet Count 225 150 - 450 10e9/L   Comprehensive metabolic panel    Collection Time: 02/15/17  8:52 AM   Result Value Ref Range    Sodium 143 133 - 144 mmol/L    Potassium 3.9 3.4 - 5.3 mmol/L    Chloride 107 94 - 109 mmol/L    Carbon Dioxide 29 20 - 32 mmol/L    Anion Gap 7 3 - 14 mmol/L    Glucose 89 70 - 99 mg/dL    Urea Nitrogen 10 7 - 30 mg/dL    Creatinine 0.57 0.52 - 1.04  mg/dL    GFR Estimate >90  Non  GFR Calc   >60 mL/min/1.7m2    GFR Estimate If Black >90   GFR Calc   >60 mL/min/1.7m2    Calcium 8.6 8.5 - 10.1 mg/dL    Bilirubin Total 0.3 0.2 - 1.3 mg/dL    Albumin 3.1 (L) 3.4 - 5.0 g/dL    Protein Total 6.5 (L) 6.8 - 8.8 g/dL    Alkaline Phosphatase 88 40 - 150 U/L    ALT 25 0 - 50 U/L    AST 18 0 - 45 U/L   T4 free    Collection Time: 02/15/17  8:52 AM   Result Value Ref Range    T4 Free 1.02 0.76 - 1.46 ng/dL            Psychiatric Examination:   Temp: 98.9  F (37.2  C) Temp src: Tympanic BP: 124/69 Pulse: 87   Resp: 16        Weight is 0 lbs 0 oz  There is no height or weight on file to calculate BMI.    Appearance: awake, alert and adequately groomed  Attitude:  evasive  Eye Contact:  poor   Mood:  good  Affect:  appropriate and in normal range  Speech:  tangential, disorganized   Psychomotor Behavior:  no evidence of tardive dyskinesia, dystonia, or tics  Throught Process:  disorganized, tangental and circumstantial  Associations:  no loose associations  Thought Content:  no evidence of suicidal ideation or homicidal ideation, no auditory hallucinations present and no visual hallucinations present  Insight:  limited  Judgement:  limited  Oriented to:  time, person, and place  Attention Span and Concentration:  limited  Recent and Remote Memory:  limited         Precautions:     Behavioral Orders   Procedures     Code 1 - Restrict to Unit     Routine Programming     As clinically indicated     Status 15     Every 15 minutes.     Suicide precautions          DIagnoses:     1. Major Depressive Disorder, moderate, without psychotic future  2. Cluster B Traits  3. Hypothyroidism  4. Peptic Ulcer  5. Chronic neck and back pain         Plan:     1. Increase Cymbalta to 40 mg a day for depression. It might help with pain management as well. Will increase as needed.   2. PRN Hydroxyzine for anxiety and Trazodone for sleep  3. Start Remeron 15 mg qhs  for sleep  4. Cognitive testing prior to discharge  3. Internal medicine to manage medical problems  4. Care was coordinated with the treatment team.  5. Patient signed voluntary, she is agreeable to stay until the end of this week.   The patient was consulted on nature of illness and treatment options. She is agreeable with the plan.       David TONY DNP  Date: 02/15/17  Time: 2:33 PM

## 2017-02-15 NOTE — PROGRESS NOTES
Brief Medicine Note    Medicine following BP's and TSH/CBC/CMP lab results    Hypothyroidism. Patient off of synthroid x 3-4 weeks PTA. TSH 8.32 today. Discussed with endocrine who recommended checking a free T4, restarting her PTA dose of synthroid 50 mcg, and rechecking TSH in 6 weeks.    HOTN (resolved). BP's have improved over past 24 hours, most recently 124/69.     Medicine will continue to follow free T4 lab result.    Linda Hearn PA-C  [facility]  Pager: [phone]

## 2017-02-15 NOTE — PROGRESS NOTES
Patient has been awake since the start of the shift. She said she could not sleep because she was having a headache. Tylenol 659 mg. given @ 0110 PRN for pain.Noted to be sleeping after an hour.  Slept 4 hours.
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
## 2017-02-15 NOTE — PROGRESS NOTES
"Writer had a 1:1 conversation with the patient in the presence of an . Patient says that she did not sleep well last night. She only slept about 2 hours because of headache and pain of her whole body. Patient says that she wants to get better. She denies SI/SIB. She further states that she won't do anything to harm herself because she cares for her family taurus her grandson. Patient says that she still feels depressed and rated it as 8 out of 10 and anxiety about the same also. Patient denies hallucinations. Patient understood regarding safety check but expressed a little concern of people \"coming in and out of my room\". Patient states \"I hope they will do harm on me. I don't know them\". Patient's appetite is good. She showered this evening.  Her speech is pressured and a little bit tangential. Patient says that hopefully she'll be able to go home soon so she can take care of her grandson.     Patient c/o of itchiness around her umbilicus. Redness and scratch marks noted on the affected area with one tiny pustule. Writer left a note in the brainboard for IM to check on it.    Schedule of the  will be 2/14/17 through 2/17/2017 at 12:00 noon and 4:30-5:30 p.m.  "

## 2017-02-16 ENCOUNTER — OFFICE VISIT (OUTPATIENT)
Dept: INTERPRETER SERVICES | Facility: CLINIC | Age: 57
End: 2017-02-16

## 2017-02-16 PROCEDURE — T1013 SIGN LANG/ORAL INTERPRETER: HCPCS | Mod: U3

## 2017-02-16 PROCEDURE — 90853 GROUP PSYCHOTHERAPY: CPT

## 2017-02-16 PROCEDURE — 25000128 H RX IP 250 OP 636: Performed by: NURSE PRACTITIONER

## 2017-02-16 PROCEDURE — 25000132 ZZH RX MED GY IP 250 OP 250 PS 637: Performed by: CLINICAL NURSE SPECIALIST

## 2017-02-16 PROCEDURE — 25000132 ZZH RX MED GY IP 250 OP 250 PS 637: Performed by: PHYSICIAN ASSISTANT

## 2017-02-16 PROCEDURE — 97150 GROUP THERAPEUTIC PROCEDURES: CPT | Mod: GO

## 2017-02-16 PROCEDURE — 90686 IIV4 VACC NO PRSV 0.5 ML IM: CPT | Performed by: NURSE PRACTITIONER

## 2017-02-16 PROCEDURE — 25000132 ZZH RX MED GY IP 250 OP 250 PS 637: Performed by: NURSE PRACTITIONER

## 2017-02-16 PROCEDURE — 99232 SBSQ HOSP IP/OBS MODERATE 35: CPT | Performed by: NURSE PRACTITIONER

## 2017-02-16 PROCEDURE — 12400002 ZZH R&B MH SENIOR/ADOLESCENT

## 2017-02-16 RX ORDER — DULOXETIN HYDROCHLORIDE 60 MG/1
60 CAPSULE, DELAYED RELEASE ORAL DAILY
Status: DISCONTINUED | OUTPATIENT
Start: 2017-02-17 | End: 2017-02-18 | Stop reason: HOSPADM

## 2017-02-16 RX ORDER — DULOXETIN HYDROCHLORIDE 20 MG/1
20 CAPSULE, DELAYED RELEASE ORAL ONCE
Status: COMPLETED | OUTPATIENT
Start: 2017-02-16 | End: 2017-02-16

## 2017-02-16 RX ADMIN — INFLUENZA A VIRUS A/CALIFORNIA/7/2009 X-179A (H1N1) ANTIGEN (FORMALDEHYDE INACTIVATED), INFLUENZA A VIRUS A/HONG KONG/4801/2014 X-263B (H3N2) ANTIGEN (FORMALDEHYDE INACTIVATED), INFLUENZA B VIRUS B/PHUKET/3073/2013 ANTIGEN (FORMALDEHYDE INACTIVATED), AND INFLUENZA B VIRUS B/BRISBANE/60/2008 ANTIGEN (FORMALDEHYDE INACTIVATED) 0.5 ML: 15; 15; 15; 15 INJECTION, SUSPENSION INTRAMUSCULAR at 18:43

## 2017-02-16 RX ADMIN — SENNOSIDES AND DOCUSATE SODIUM 1 TABLET: 8.6; 5 TABLET ORAL at 21:09

## 2017-02-16 RX ADMIN — RANITIDINE HYDROCHLORIDE 150 MG: 150 TABLET, FILM COATED ORAL at 21:09

## 2017-02-16 RX ADMIN — ACETAMINOPHEN 650 MG: 325 TABLET, FILM COATED ORAL at 21:10

## 2017-02-16 RX ADMIN — DULOXETINE 20 MG: 20 CAPSULE, DELAYED RELEASE PELLETS ORAL at 16:16

## 2017-02-16 RX ADMIN — DULOXETINE 40 MG: 20 CAPSULE, DELAYED RELEASE PELLETS ORAL at 09:16

## 2017-02-16 RX ADMIN — ACETAMINOPHEN 650 MG: 325 TABLET, FILM COATED ORAL at 14:50

## 2017-02-16 RX ADMIN — OMEPRAZOLE 20 MG: 20 CAPSULE, DELAYED RELEASE ORAL at 06:56

## 2017-02-16 RX ADMIN — MIRTAZAPINE 15 MG: 15 TABLET, FILM COATED ORAL at 21:09

## 2017-02-16 RX ADMIN — LEVOTHYROXINE SODIUM 50 MCG: 50 TABLET ORAL at 06:56

## 2017-02-16 ASSESSMENT — ACTIVITIES OF DAILY LIVING (ADL)
DRESS: SCRUBS (BEHAVIORAL HEALTH);INDEPENDENT
GROOMING: INDEPENDENT
ORAL_HYGIENE: INDEPENDENT
DRESS: INDEPENDENT
ORAL_HYGIENE: INDEPENDENT
GROOMING: INDEPENDENT

## 2017-02-16 NOTE — PROGRESS NOTES
Brief Medicine Note    Medicine following for Free T4 lab results.    TSH 8.32, Free T4 1.02 on 2/15. Re-discussed case with endocrine who agree with continuing plan of restarting synthroid 50 mcg and rechecking TSH/free T4 in a few weeks.    Medicine will sign off at this time. Please page the Internal Medicine job code pager for any intercurrent medical issues which arise. Thank you for the opportunity to be a part of this patient's care.       Linda Hearn PA-C  [facility]  Pager: [phone]

## 2017-02-16 NOTE — PLAN OF CARE
Problem: General Plan of Care (Inpatient Behavioral)  Goal: Individualization/Patient Specific Goal (IP Behavioral)  The patient and/or their representative will achieve their patient-specific goals related to the plan of care.    The patient-specific goals include:   Illness Management Recovery model: Objectives    Patient will identify reason(s) for hospitalization from their perspective.  Patient will identify a minimum of three goals for discharge.  Patient will identify a minimum of three triggers that may increase their symptoms.  Patient will identify a minimum of three coping skills they can do to stay well.   Patient will identify their support system to demonstrate readiness for discharge.   Illness Management Recovery model:  Feedback.     Patient identified the following people they would like to receive feedback from if/when they see early warning signs:     Friend(s)      Family(s):  [place]     Partner/Spouse:      Support Group Member(s):      Co-Worker(s):
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
## 2017-02-17 ENCOUNTER — OFFICE VISIT (OUTPATIENT)
Dept: INTERPRETER SERVICES | Facility: CLINIC | Age: 57
End: 2017-02-17

## 2017-02-17 PROCEDURE — 99238 HOSP IP/OBS DSCHRG MGMT 30/<: CPT | Performed by: NURSE PRACTITIONER

## 2017-02-17 PROCEDURE — 12400002 ZZH R&B MH SENIOR/ADOLESCENT

## 2017-02-17 PROCEDURE — 25000132 ZZH RX MED GY IP 250 OP 250 PS 637: Performed by: NURSE PRACTITIONER

## 2017-02-17 PROCEDURE — T1013 SIGN LANG/ORAL INTERPRETER: HCPCS | Mod: U3

## 2017-02-17 PROCEDURE — 25000132 ZZH RX MED GY IP 250 OP 250 PS 637: Performed by: CLINICAL NURSE SPECIALIST

## 2017-02-17 PROCEDURE — 25000132 ZZH RX MED GY IP 250 OP 250 PS 637: Performed by: PHYSICIAN ASSISTANT

## 2017-02-17 RX ORDER — LEVOTHYROXINE SODIUM 50 UG/1
50 TABLET ORAL
Qty: 30 TABLET | Refills: 0 | Status: SHIPPED | OUTPATIENT
Start: 2017-02-17

## 2017-02-17 RX ORDER — DULOXETIN HYDROCHLORIDE 60 MG/1
60 CAPSULE, DELAYED RELEASE ORAL DAILY
Qty: 30 CAPSULE | Refills: 0 | Status: SHIPPED
Start: 2017-02-17 | End: 2017-03-19

## 2017-02-17 RX ORDER — MIRTAZAPINE 15 MG/1
15 TABLET, FILM COATED ORAL AT BEDTIME
Qty: 30 TABLET | Refills: 0 | Status: SHIPPED
Start: 2017-02-17 | End: 2017-03-19

## 2017-02-17 RX ADMIN — ACETAMINOPHEN 650 MG: 325 TABLET, FILM COATED ORAL at 21:16

## 2017-02-17 RX ADMIN — MIRTAZAPINE 15 MG: 15 TABLET, FILM COATED ORAL at 21:16

## 2017-02-17 RX ADMIN — OMEPRAZOLE 20 MG: 20 CAPSULE, DELAYED RELEASE ORAL at 06:53

## 2017-02-17 RX ADMIN — ACETAMINOPHEN 650 MG: 325 TABLET, FILM COATED ORAL at 17:24

## 2017-02-17 RX ADMIN — SENNOSIDES AND DOCUSATE SODIUM 1 TABLET: 8.6; 5 TABLET ORAL at 21:16

## 2017-02-17 RX ADMIN — LEVOTHYROXINE SODIUM 50 MCG: 50 TABLET ORAL at 06:53

## 2017-02-17 RX ADMIN — ACETAMINOPHEN 650 MG: 325 TABLET, FILM COATED ORAL at 12:43

## 2017-02-17 RX ADMIN — RANITIDINE HYDROCHLORIDE 150 MG: 150 TABLET, FILM COATED ORAL at 21:16

## 2017-02-17 RX ADMIN — DULOXETINE 60 MG: 60 CAPSULE, DELAYED RELEASE ORAL at 08:53

## 2017-02-17 ASSESSMENT — ACTIVITIES OF DAILY LIVING (ADL)
DRESS: INDEPENDENT
GROOMING: INDEPENDENT
ORAL_HYGIENE: INDEPENDENT
GROOMING: INDEPENDENT
DRESS: INDEPENDENT
LAUNDRY: UNABLE TO COMPLETE
ORAL_HYGIENE: INDEPENDENT

## 2017-02-17 NOTE — PROGRESS NOTES
"   02/16/17 1800   General Information   Special Considerations completed on 2-16   Clinical Impression   Affect Appropriate to situation   Orientation Oriented to person, place and time   Appearance and ADLs General cleanliness observed in most areas   Attention to Internal Stimuli No observed signs   Interaction Skills Initiates appropriately with staff;Initiates appropriately with peers   Ability to Communicate Needs Independent   Verbal Content Clear;Appropriate to topic;Other (see comments)  (through )   Ability to Maintain Boundaries Maintains appropriate physical boundaries;Maintains appropriate verbal boundaries   Participation Initiates participation   Concentration Concentrates 50 minutes   Ability to Concentrate Needs further assessment;With structure   Follows and Comprehends Directions Independently follows 2 step verbal directions;Needs further assessment   Memory Needs further assessment   Organization Independently organizes medium tasks;Needs further assessment   Decision Making Independent   Planning and Problem Solving Needs further assessment   Ability to Apply and Learn Concepts Needs further assessment   Frustrations / Stress Tolerance Independently identifies sources of frustration/stress   Level of Insight Some insight   Self Esteem Can identify positives   Social Supports Identifies utilizing supports   General Observation/Plan   General Observations/Plan See Comments   Attended 2 of 2 OT groups. During the am session, through the , she stated reason for admission as 1. Ulcer issues causing lots of symptoms, didn't want to ask for help or bother anyone and 2 felt sick going through divorce, got to thinking of suicide\". She stated her support is \"good\". Changes she wants at d/c is \"that they not tell me they don't love me. I don't need you, this is my house\". She chose the goal focus to improve esteem and work more cooperatively with others. Affect was bright. She was " social through , animated. She also attended the afternoon group. With 2 or 3 practices, she learned activity requiring problem solving utilizing visuospatial concepts. She appeared to be able to successfully find solutions. During memory activity, she remembered 3/5 words after a delay of 10 minutes. She abstracted solutions and subtracted serial 7s accurately and quickly. Additional assessment will be helpful with additional attendance. Pt was given and completed a written self assessment. OT purpose was explained with the value of having involvement in treatment plan, and provided options to meet self identified goals. Plan:  Encourage attendance. Offer opportunity for success oriented, more structured task work, assess abilities, grade complexity if needed.
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
## 2017-02-17 NOTE — PROGRESS NOTES
Contacted Linda Hearn PA-C to reconcile discharge medications.     1245 PRN tylenol 650 mg per pt request for headache. Appears affective, riding stationary bike 20 minutes later.     Isolative, refused to go to group, states all the talking makes her head hurt.

## 2017-02-17 NOTE — DISCHARGE SUMMARY
"Psychiatric Discharge Summary    Kayla Pierce MRN# [id number]   Age: 56 year old YOB: 1960     Date of Admission:  2/13/2017  Date of Discharge:  2/17/2017  Admitting Physician:  Ruchi Faye MD  Discharge Physician:  CHAVO Escobar CNP (Contact: [phone])         Event Leading to Hospitalization:   Kayla Pierce is a 56 year old female admited for worsening depression and suicidal ideation with a plan to overdose on her medications. Patient was brought to the ED by her son seeking help for chest pain, head and neck pain, and abdominal discomfort. She spent the previous night in the bathroom of her outpatient clinic. When asked why she said that she had diarrhea and vomiting all night and couldn't live the bathroom. She was found by the staff the next morning still in the bathroom. Patient has been having chest pain, nausea and diarrhea for about a month. She sais she was given an antibiotic for ear infection at the same time her symptoms begun. It looks like her son believe that patient is \"acting out\" because of the upcoming divorce. It appears that patient's  has been trying to divorce her for about 15 years but patient declined to accept and sign any of the necessary paper work. About two months ago the  decided to go ahead with the divorce. Since than patient has been more depressed and tried to commit suicide several times by hanging herself with a scar. She has stopped taking all of her medications about a month ago. When in the ED patient confirmed that she is depressed and wants to end her life, although her statement was incongruent with her affect which was bright.   Patient admits of having depressive symptoms which include depressed mood for about 2 months, decrease sleep-about 2 hours a night on average \"I have so much pain all the time\", decrease energy, poor appetite, excessive worries, nervousness, racing thoughts and " restlessness. She denies suicidal thinking.' states the last time she attempted to commit suicide was 2 years ago when her  moved out of their house. She denies any suicide attempts since then, states if she tried something she doesn't remember. States she has been having problems with her memory and concentration and contributes it to the daily headaches she has been having. Denies hallucinations, delusions. Denies homicidal ideation and self injury behaviors.   Patient is a poor historian. She is unable to tell me what brings her to the hospital other than having significant pain problems. She did not know what psychiatric diagnose/s she was given in the past or what type of medications she was using. States she stopped taking her medications because she got sick with the ear infection and wasn't able to fill them up. States whatever she was taking in the past for her depression was helpful. She didn't remember her doctor's name or where he/she works. According to the records she is seeing Dr. Florence Russlel who is her PCP. Dr. Dumont recently changed her Zoloft to Cymbalta due persistent headaches and nausea she was having with Zoloft. However according to her pharmacy, patient never picked up the Cymbalta. It is not clear what other medications she had tried in the past. This is her first psychiatric admission.        See Admission note by [facility] for additional details.          DIagnoses:     1. Major Depressive Disorder, moderate, without psychotic future  2. Cluster B Traits  3. Hypothyroidism  4. Peptic Ulcer  5. Chronic neck and back pain         Labs:     Recent Results (from the past 168 hour(s))   CBC + differential    Collection Time: 02/12/17  6:30 PM   Result Value Ref Range    WBC 9.0 4.0 - 11.0 10e9/L    RBC Count 4.72 3.8 - 5.2 10e12/L    Hemoglobin 13.5 11.7 - 15.7 g/dL    Hematocrit 41.5 35.0 - 47.0 %    MCV 88 78 - 100 fl    MCH 28.6 26.5 - 33.0 pg    MCHC 32.5  31.5 - 36.5 g/dL    RDW 13.4 10.0 - 15.0 %    Platelet Count 266 150 - 450 10e9/L    Diff Method Automated Method     % Neutrophils 63.4 %    % Lymphocytes 29.5 %    % Monocytes 5.0 %    % Eosinophils 1.2 %    % Basophils 0.7 %    % Immature Granulocytes 0.2 %    Nucleated RBCs 0 0 /100    Absolute Neutrophil 5.7 1.6 - 8.3 10e9/L    Absolute Lymphocytes 2.7 0.8 - 5.3 10e9/L    Absolute Monocytes 0.5 0.0 - 1.3 10e9/L    Absolute Eosinophils 0.1 0.0 - 0.7 10e9/L    Absolute Basophils 0.1 0.0 - 0.2 10e9/L    Abs Immature Granulocytes 0.0 0 - 0.4 10e9/L    Absolute Nucleated RBC 0.0    Comprehensive metabolic panel    Collection Time: 02/12/17  6:30 PM   Result Value Ref Range    Sodium 139 133 - 144 mmol/L    Potassium 4.0 3.4 - 5.3 mmol/L    Chloride 106 94 - 109 mmol/L    Carbon Dioxide 24 20 - 32 mmol/L    Anion Gap 9 3 - 14 mmol/L    Glucose 97 70 - 99 mg/dL    Urea Nitrogen 8 7 - 30 mg/dL    Creatinine 0.54 0.52 - 1.04 mg/dL    GFR Estimate >90  Non  GFR Calc   >60 mL/min/1.7m2    GFR Estimate If Black >90   GFR Calc   >60 mL/min/1.7m2    Calcium 8.6 8.5 - 10.1 mg/dL    Bilirubin Total 0.6 0.2 - 1.3 mg/dL    Albumin 3.7 3.4 - 5.0 g/dL    Protein Total 7.9 6.8 - 8.8 g/dL    Alkaline Phosphatase 118 40 - 150 U/L    ALT 39 0 - 50 U/L    AST 29 0 - 45 U/L   Lipase    Collection Time: 02/12/17  6:30 PM   Result Value Ref Range    Lipase 163 73 - 393 U/L   Troponin I (now)    Collection Time: 02/12/17  6:30 PM   Result Value Ref Range    Troponin I ES  0.000 - 0.045 ug/L     <0.015  The 99th percentile for upper reference range is 0.045 ug/L.  Troponin values in   the range of 0.045 - 0.120 ug/L may be associated with risks of adverse   clinical events.     D dimer quantitative    Collection Time: 02/12/17  6:30 PM   Result Value Ref Range    D Dimer 0.3 0.0 - 0.50 ug/ml FEU   Acetaminophen level    Collection Time: 02/12/17  6:30 PM   Result Value Ref Range    Acetaminophen Level  <2  Therapeutic range: 10-20 mg/L   mg/L   Salicylate level    Collection Time: 02/12/17  6:30 PM   Result Value Ref Range    Salicylate Level  mg/dL     <2  Therapeutic:        <20   Anti inflammatory:  15-30     Alcohol level blood    Collection Time: 02/12/17  6:30 PM   Result Value Ref Range    Ethanol g/dL <0.01 <0.01 g/dL   EKG 12 lead    Collection Time: 02/12/17  6:34 PM   Result Value Ref Range    Interpretation ECG Click View Image link to view waveform and result    UA with Microscopic    Collection Time: 02/12/17  7:52 PM   Result Value Ref Range    Color Urine Yellow     Appearance Urine Clear     Glucose Urine Negative NEG mg/dL    Bilirubin Urine Negative NEG    Ketones Urine Negative NEG mg/dL    Specific Gravity Urine 1.007 1.003 - 1.035    Blood Urine Negative NEG    pH Urine 6.5 5.0 - 7.0 pH    Protein Albumin Urine Negative NEG mg/dL    Urobilinogen mg/dL Normal 0.0 - 2.0 mg/dL    Nitrite Urine Negative NEG    Leukocyte Esterase Urine Negative NEG    Source Midstream Urine     WBC Urine 1 0 - 2 /HPF    RBC Urine 0 0 - 2 /HPF    Squamous Epithelial /HPF Urine 1 0 - 1 /HPF    Transitional Epi <1 0 - 1 /HPF    Mucous Urine Present (A) NEG /LPF   Drug abuse screen 77 urine    Collection Time: 02/12/17  7:52 PM   Result Value Ref Range    Amphetamine Qual Urine  NEG     Negative   Cutoff for a negative amphetamine is 500 ng/mL or less.      Barbiturates Qual Urine  NEG     Negative   Cutoff for a negative barbiturate is 200 ng/mL or less.      Benzodiazepine Qual Urine  NEG     Negative   Cutoff for a negative benzodiazepine is 200 ng/mL or less.      Cannabinoids Qual Urine  NEG     Negative   Cutoff for a negative cannabinoid is 50 ng/mL or less.      Cocaine Qual Urine  NEG     Negative   Cutoff for a negative cocaine is 300 ng/mL or less.      Opiates Qualitative Urine  NEG     Negative   Cutoff for a negative opiate is 300 ng/mL or less.      PCP Qual Urine  NEG     Negative   Cutoff for a  negative PCP is 25 ng/mL or less.     Clostridium difficile toxin B PCR    Collection Time: 02/13/17  1:20 PM   Result Value Ref Range    Specimen Description Feces     C Diff Toxin B PCR  NEG     Negative  Negative: Clostridium difficile target DNA sequences NOT detected, presumed   negative for Clostridium difficile toxin B or the number of bacteria present   may be below the limit of detection for the test.   FDA approved assay performed using [employer] GeneXpert real-time PCR.   A negative result does not exclude actual disease due to Clostridium difficile   and may be due to improper collection, handling and storage of the specimen or   the number of organisms in the specimen is below the detection limit of the   assay.     TSH    Collection Time: 02/15/17  8:52 AM   Result Value Ref Range    TSH 8.32 (H) 0.40 - 4.00 mU/L   CBC with platelets    Collection Time: 02/15/17  8:52 AM   Result Value Ref Range    WBC 8.9 4.0 - 11.0 10e9/L    RBC Count 4.20 3.8 - 5.2 10e12/L    Hemoglobin 12.0 11.7 - 15.7 g/dL    Hematocrit 37.1 35.0 - 47.0 %    MCV 88 78 - 100 fl    MCH 28.6 26.5 - 33.0 pg    MCHC 32.3 31.5 - 36.5 g/dL    RDW 13.5 10.0 - 15.0 %    Platelet Count 225 150 - 450 10e9/L   Comprehensive metabolic panel    Collection Time: 02/15/17  8:52 AM   Result Value Ref Range    Sodium 143 133 - 144 mmol/L    Potassium 3.9 3.4 - 5.3 mmol/L    Chloride 107 94 - 109 mmol/L    Carbon Dioxide 29 20 - 32 mmol/L    Anion Gap 7 3 - 14 mmol/L    Glucose 89 70 - 99 mg/dL    Urea Nitrogen 10 7 - 30 mg/dL    Creatinine 0.57 0.52 - 1.04 mg/dL    GFR Estimate >90  Non  GFR Calc   >60 mL/min/1.7m2    GFR Estimate If Black >90   GFR Calc   >60 mL/min/1.7m2    Calcium 8.6 8.5 - 10.1 mg/dL    Bilirubin Total 0.3 0.2 - 1.3 mg/dL    Albumin 3.1 (L) 3.4 - 5.0 g/dL    Protein Total 6.5 (L) 6.8 - 8.8 g/dL    Alkaline Phosphatase 88 40 - 150 U/L    ALT 25 0 - 50 U/L    AST 18 0 - 45 U/L   T4 free    Collection  Time: 02/15/17  8:52 AM   Result Value Ref Range    T4 Free 1.02 0.76 - 1.46 ng/dL              Consults:   Consultation during this admission received from internal medicine.         Hospital Course:   Kayla Pierce was admitted to Station [address] [place] [address] with attending David [facility] DNP. The patient was placed under status 15 (15 minute checks) to ensure patient safety.  Kayla Pierce did participate in groups and was visible in the milieu.     The patient's symptoms of depression and suicidal ideation improved. Patient was started on Cymbalta for depression and Remeron for sleep. At the day of discharge patient rated depression as 5/10 (10 worst), denies SI, SIB, hallucinations, delusions. The staff documented 7 hours of sleep the last two nights. Patient was eating all of her meals, took showers every day, attended groups and used the exercise bike. Her mood improved, her affect was bright in the last two days. She denied any side effects of the medications.     Kayla Pierce was released to home. At the time of discharge Kayla Pierce was determined to not be a danger to herself or others.          Discharge Medications:     Current Discharge Medication List      START taking these medications    Details   mirtazapine (REMERON) 15 MG tablet Take 1 tablet (15 mg) by mouth At Bedtime  Qty: 30 tablet, Refills: 0    Associated Diagnoses: Recurrent major depressive disorder, in partial remission (H)      ranitidine (ZANTAC) 150 MG tablet Take 1 tablet (150 mg) by mouth At Bedtime  Qty: 60 tablet, Refills: 0    Associated Diagnoses: Ulcer (H)         CONTINUE these medications which have CHANGED    Details   DULoxetine (CYMBALTA) 60 MG EC capsule Take 1 capsule (60 mg) by mouth daily  Qty: 30 capsule, Refills: 0    Associated Diagnoses: Recurrent major depressive disorder, in partial remission (H)      levothyroxine (SYNTHROID/LEVOTHROID) 50 MCG tablet Take  1 tablet (50 mcg) by mouth every morning (before breakfast)  Qty: 30 tablet, Refills: 0    Associated Diagnoses: Hypothyroidism, unspecified type      omeprazole (PRILOSEC) 20 MG CR capsule Take 1 capsule (20 mg) by mouth every morning (before breakfast)  Qty: 30 capsule, Refills: 0    Associated Diagnoses: Ulcer (H)         CONTINUE these medications which have NOT CHANGED    Details   diclofenac (VOLTAREN) 0.1 % ophthalmic solution Use one drop in affected eye up to four times a day  Qty: 10 mL, Refills: 1      methocarbamol (ROBAXIN) 500 MG tablet Take 1 tablet (500 mg) by mouth 4 times daily as needed for muscle spasms  Qty: 40 tablet, Refills: 0      acetaminophen (TYLENOL) 325 MG tablet Take 2 tablets (650 mg) by mouth every 4 hours as needed  Qty: 40 tablet, Refills: 0         STOP taking these medications       Sertraline HCl (ZOLOFT PO) Comments:   Reason for Stopping:         ibuprofen (ADVIL,MOTRIN) 200 MG tablet Comments:   Reason for Stopping:         UNKNOWN TO PATIENT Comments:   Reason for Stopping:                    Psychiatric Examination:   Appearance:  awake, alert and dressed in hospital scrubs  Attitude:  cooperative and evasive  Eye Contact:  good  Mood:  sad  and better  Affect:  appropriate and in normal range  Speech:  disorganized , normal rate and rhythm   Psychomotor Behavior:  no evidence of tardive dyskinesia, dystonia, or tics  Thought Process:  goal oriented  Associations:  no loose associations  Thought Content:  no evidence of suicidal ideation or homicidal ideation, no auditory hallucinations present and no visual hallucinations present  Insight:  fair  Judgment:  fair  Oriented to:  time, person, and place  Attention Span and Concentration:  fair  Recent and Remote Memory:  fair  Language/ Fund of Knowledge: appropriate  Muscle Strength and Tone: normal  Gait and Station: Normal         Discharge Plan:     Follow-up Appointments      Follow Up ([facility])      Follow up with  primary care provider, [facility], within 7 days to evaluate medication change and for hospital follow- up. Recommend repeat Free T4/TSH in 1-2 weeks and adjust synthroid dose as needed.     Appointments on [place] and/or [facility] (with [facility] or [facility] provider or service). Call [phone] if you haven't heard regarding these appointments within 7 days of discharge.                     Your next 10 appointments already scheduled      Feb 17, 2017  4:30 PM [facility] Inpatient with Gabino Gaffney    Services Department ([facility])    [address] [place] [phone]                 Feb 18, 2017 10:00 AM [facility] Inpatient with Carolyn Walsh    Services Department ([facility])    [address] [place] [phone]                 Feb 18, 2017  4:30 PM [facility] Inpatient with Carolyn Walsh    Services Department ([facility])    [address] [place] [phone]                 Feb 19, 2017 10:00 AM [facility] Inpatient with Carolyn Walsh    Services Department ([facility])    [address] [place] [phone]                 Feb 19, 2017  4:30 PM [facility] Inpatient with Carolyn Walsh    Services Department ([facility])    [address] [place] [phone]                 Feb 20, 2017 10:00 AM [facility] Inpatient with Kylee Meier    Services Department ([facility])    [address] [place] [phone]                 Feb 20, 2017  4:30 PM [facility] Inpatient with Lia Yu    Services Department ([facility])    [address] [place] [phone]                 Feb 21, 2017 10:00 AM CST   [place] Inpatient with Kylee Meier    Services Department ([facility])    [address] [place] [phone]                 Feb 21, 2017  4:30 PM CST   [place] Inpatient with Noelle Bridges    Services Department ([facility])    [address] [place] [phone]          Attestation:  The patient has been seen and evaluated by me,  David Haji, CHAVO CNP     2/17/2017   1:00 PM

## 2017-02-17 NOTE — PROGRESS NOTES
Writer called pts son Xavier (Sean in chart) to notify of discharge plan tommrow 2/18/19 and follow up care. Xavier said he will  pt around 12 noon tomorrow and will support her around follow up care.

## 2017-02-18 VITALS
RESPIRATION RATE: 14 BRPM | DIASTOLIC BLOOD PRESSURE: 69 MMHG | HEART RATE: 83 BPM | SYSTOLIC BLOOD PRESSURE: 114 MMHG | TEMPERATURE: 97.2 F | OXYGEN SATURATION: 97 %

## 2017-02-18 PROCEDURE — 25000132 ZZH RX MED GY IP 250 OP 250 PS 637: Performed by: PHYSICIAN ASSISTANT

## 2017-02-18 PROCEDURE — 25000132 ZZH RX MED GY IP 250 OP 250 PS 637: Performed by: NURSE PRACTITIONER

## 2017-02-18 PROCEDURE — 25000132 ZZH RX MED GY IP 250 OP 250 PS 637: Performed by: CLINICAL NURSE SPECIALIST

## 2017-02-18 RX ADMIN — LEVOTHYROXINE SODIUM 50 MCG: 50 TABLET ORAL at 06:55

## 2017-02-18 RX ADMIN — DULOXETINE 60 MG: 60 CAPSULE, DELAYED RELEASE ORAL at 08:49

## 2017-02-18 RX ADMIN — OMEPRAZOLE 20 MG: 20 CAPSULE, DELAYED RELEASE ORAL at 06:55

## 2017-02-18 RX ADMIN — ACETAMINOPHEN 650 MG: 325 TABLET, FILM COATED ORAL at 08:53

## 2017-02-18 ASSESSMENT — ACTIVITIES OF DAILY LIVING (ADL)
DRESS: INDEPENDENT
ORAL_HYGIENE: INDEPENDENT
GROOMING: INDEPENDENT
LAUNDRY: UNABLE TO COMPLETE

## 2017-02-18 NOTE — PROGRESS NOTES
Pt discharge to home via private auto with son after being treated for suicidal ideation and depression.   Pt mood is somewhat depressed.   Denies SI, SIB, HI, and hallucinations.     AVS reviewed, notified of follow up appointments, and crisis phone numbers.  Medication schedule and 30 supply of synthroid, Cymbalta, zantac, Prilosec, and Remeron sent. AVS read in its entirety and questions answered with use of interpretor. Pt wanted staff to explain to son that her headaches are real and related to her medical issues and that she does not fake her symptoms. This has been discussed during her treatment. It was explained to pt that this is topic for therapy with use of first interpretor, son, and phone interpretor. Pt denies having any questions.     All belongings returned.   Survey given.
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
## 2017-02-18 NOTE — PROGRESS NOTES
Pt reports she feels ready for discharge.  She feels her medications are working well for her.  Only concern currently is her headaches which are chronic.  Reviewed her pain management techniques including exercise, avoiding overstimulation, and tylenol.  Pt had tylenol x 2 this evening for headache.  Reports that headaches have been worse on unit because of more talking and stimulation.  Was present in the milieu throughout shift.  Rode bike at least 90 minutes.      Family visited this evening.  Pt reports visit went well.  Pt's son will pick her up tomorrow at noon.

## 2017-02-20 NOTE — PROGRESS NOTES
Writer completed RPP with pt as she is discharging tomorrow (Saturday ) and [facility] will not be here. See chart for copy.

## 2018-10-11 NOTE — DISCHARGE INSTRUCTIONS
Behavioral Discharge Planning and Instructions      Summary:  You were admitted on 2/13/2017  For Suicidal Ideations.  You were treated by David Haji DNP and discharged today from [facility].    Main Diagnosis:   1. Major Depressive Disorder, moderate, without psychotic future  2. Cluster B Traits  3. Hypothyroidism  4. Peptic Ulcer  5. Chronic neck and back pain    Health Care Follow-up Appointments:   Pscyhatrist/ Medication Management:  Ina Jang: Initial appointment  Friday February 24th 2017 at 1:00pm.   [facility]  [address] [place] [address]     [phone] Fax [phone]        Therapist:  AGUEDA Michelle: Initial appointment  Monday March 13th 2017 at 1:00pm. You are also on a cancellation wait list. Provider will call if they can get you in before 3/13/17 appointment.   [facility]  [address], [place] [address]    [phone] Fax [phone]    Attend all scheduled appointments with your outpatient providers. Call at least 24 hours in advance if you need to reschedule an appointment to ensure continued access to your outpatient providers.   Major Treatments, Procedures and Findings:  You were provided with: a psychiatric assessment, assessed for medical stability, medication evaluation and/or management, group therapy, milieu management and medical interventions    Symptoms to Report: feeling more aggressive, increased confusion, losing more sleep, mood getting worse or thoughts of suicide    Early warning signs can include: increased depression or anxiety sleep disturbances increased thoughts or behaviors of suicide or self-harm  increased unusual thinking, such as paranoia or hearing voices    Safety and Wellness:  Take all medicines as directed.  Make no changes unless your doctor suggests them.      Follow treatment recommendations.  Refrain from alcohol and non-prescribed drugs.  If there is a concern for  Written by Tavo Willis, as dictated by Dr. Jacqui Dallas MD. 
 
[facility] Arlyn Barakat is a 77 y.o. female. HPI The patient presents today for a medication refill. She is not fasting for labs today. She denies heartburn and joint pain. BP is high today at 144/78, 142/80 on repeat. Compliant on HCTZ 25 mg, but notes that she forgot to take it this morning. Patient notes that she did not sleep well last night and woke up at 4 am, which she experiences sometimes. She notes that she has a BP monitor at home, but she has not been checking it. She went to PT and notes that she was feeling good during her sessions, but sometimes she still feels dizzy at home. Patient notes that she is compliant on Pravachol 20 mg, which she takes before bed. Patient notes that she has been walking and going to the [facility]. She notes that she has been eating less meat, but is eating more vegetables and fish. She notes that she still eats rice sometimes. Compliant on Fosamax 70 mg, which she takes every Saturday. She has not had a Dexa study. Her last mammogram was in 03/2018. She is followed by ophthalmology and notes that she has eye exams every 6 months. Patient Active Problem List  
Diagnosis Code  Mixed hyperlipidemia E78.2  
 Osteopenia M85.80  Essential hypertension I10  
 Nonexudative age-related macular degeneration H35.3190  Dry eyes H04.123  Pseudophakia Z96.1  Osteopenia of multiple sites M85.89 Current Outpatient Prescriptions on File Prior to Visit Medication Sig Dispense Refill  hydroCHLOROthiazide (HYDRODIURIL) 25 mg tablet Take 1 Tab by mouth daily. 90 Tab 0  pravastatin (PRAVACHOL) 20 mg tablet Take 1 Tab by mouth nightly for 90 days. 90 Tab 0  
 alendronate (FOSAMAX) 70 mg tablet Take 1 Tab by mouth every seven (7) days for 30 doses.  Need appt prior to next refill  Indications: POST-MENOPAUSAL OSTEOPOROSIS 12 Tab 0  
 "safety, call 911.    Resources:   Crisis Intervention: 541.127.2111 or 185-959-9904 (TTY: 322.931.4700).  Call anytime for help.  National [place] on Mental Illness (www.mn.jose.org): 319.594.7595 or 509-815-5043.  Suicide Awareness Voices of Education (SAVE) (www.save.org): 656-124-HTWK (8645)  National Suicide Prevention Line (www.mentalhealthmn.org): 790-906-OUAR (8886)  Self- Management and Recovery Training., SMART-- Toll free: 969.239.7431  www.[employer]  Text 4 Life: txt \"LIFE\" to 85[address] for immediate support and crisis intervention  Crisis text line: Text \"START\" to 204-896. Free, confidential, 24/7.  Crisis Intervention: 556.708.9421 or 490-523-8075. Call anytime for help.   [facility] Crisis Team:  [phone]    The treatment team has appreciated the opportunity to work with you.     If you have any questions or concerns our unit number is [phone].  You may be receiving a follow-up phone call within the next three days from a representative from behavioral health.    You have identified the best phone number to reach you as [phone] .  "  cholecalciferol (VITAMIN D3) 50,000 unit capsule Take 1 Cap by mouth Every Thursday. 12 Cap 0 No current facility-administered medications on file prior to visit. Allergies Allergen Reactions  No Known Allergies Hives Past Medical History:  
Diagnosis Date  Hypercholesterolemia  Hypertension Social History Social History  Marital status: UNKNOWN Spouse name: N/A  
 Number of children: N/A  
 Years of education: N/A Occupational History  Not on file. Social History Main Topics  Smoking status: Never Smoker  Smokeless tobacco: Never Used  Alcohol use No  
 Drug use: Not on file  Sexual activity: No  
 
Other Topics Concern  Not on file Social History Narrative Review of Systems Constitutional: Negative for malaise/fatigue. HENT: Negative for congestion. Eyes: Negative for blurred vision and pain. Respiratory: Negative for cough and shortness of breath. Cardiovascular: Negative for chest pain and palpitations. Gastrointestinal: Negative for abdominal pain and heartburn. Genitourinary: Negative for frequency and urgency. Musculoskeletal: Negative for joint pain and myalgias. Neurological: Positive for dizziness. Negative for tingling, sensory change, weakness and headaches. Psychiatric/Behavioral: Negative for depression, memory loss and substance abuse. Visit Vitals  /76 (BP 1 Location: Left arm, BP Patient Position: Sitting)  Pulse 66  Temp 98 °F (36.7 °C) (Oral)  Resp 16  
 Ht 5' 3.5\" (1.613 m)  Wt 152 lb (68.9 kg)  SpO2 94%  BMI 26.5 kg/m2 Physical Exam  
Constitutional: She is oriented to person, place, and time. She appears well-developed and well-nourished. No distress. HENT:  
Right Ear: External ear normal.  
Left Ear: External ear normal.  
Eyes: Conjunctivae and EOM are normal. Right eye exhibits no discharge. Left eye exhibits no discharge. Neck: Normal range of motion. Neck supple. Cardiovascular: Normal rate, regular rhythm and normal heart sounds. Pulmonary/Chest: Effort normal and breath sounds normal. She has no wheezes. Abdominal: Soft. Bowel sounds are normal. There is no tenderness. Lymphadenopathy:  
  She has no cervical adenopathy. Neurological: She is alert and oriented to person, place, and time. Skin: She is not diaphoretic. Psychiatric: She has a normal mood and affect. Her behavior is normal.  
Nursing note and vitals reviewed. ASSESSMENT and PLAN 
  ICD-10-CM ICD-9-CM 1. Essential hypertension P59 123.0 METABOLIC PANEL, COMPREHENSIVE 
 
CMP ordered. BP was high today because she has not taken HCTZ yet. Patient will return to the office to learn how to use her BP monitor. She should monitor her BP at home. 2. Mixed hyperlipidemia E78.2 272.2 LIPID PANEL Compliant on Pravachol 20 mg. Pt will return during lab hours to have basic fasting labs drawn. 3. Dizziness R42 780.4 She should continue the exercises as  PT taught her to help relieve her dizziness. 4. Early dry stage nonexudative age-related macular degeneration of both eyes H35.3134 362.51 Followed by ophthalmology. 5. Osteopenia of multiple sites M85.89 733.90 Patient plans to have a Dexa study when she has her next mammogram. Compliant on Fosamax once a  week. This plan was reviewed with the patient and patient agrees. All questions were answered. This scribe documentation was reviewed by me and accurately reflects the examination and decisions made by me. This note will not be viewable in [address]

## 2020-01-29 NOTE — IP AVS SNAPSHOT
MRN:[id number]                      After Visit Summary   2/13/2017    Kayla Pierce    MRN: [id number]           Thank you!     Thank you for choosing [facility] for your care. Our goal is always to provide you with excellent care.        Patient Information     Date Of Birth          1960        About your hospital stay     You were admitted on:  February 13, 2017 You last received care in the:  [address]    You were discharged on:  February 18, 2017       Who to Call     For medical emergencies, please call 911.  For non-urgent questions about your medical care, please call your primary care provider or clinic, [phone]          Attending Provider     Provider Specialty    Ruchi Faye MD Psychiatry       Primary Care Provider Office Phone # Fax #    [facility] [phone] [address] [place] [place] [address]        Follow-up Appointments     Follow Up ([facility])       Follow up with primary care provider, [facility], within 7 days to evaluate medication change and for hospital follow- up. Recommend repeat Free T4/TSH in 1-2 weeks and adjust synthroid dose as needed.    Appointments on [place] and/or [facility] (with [facility] or [facility] provider or service). Call [phone] if you haven't heard regarding these appointments within 7 days of discharge.                  Your next 10 appointments already scheduled     Feb 18, 2017  4:30 PM [facility] Inpatient with Carolyn Walsh    Services Department ([facility])    [address] [place] [id number]               Feb 19, 2017 10:00 AM [facility] Inpatient with Carolyn Walsh    Services Department ([facility])    [address] [place] [id number]               Feb 19, 2017  4:30 PM [facility] Inpatient with Carolyn Walsh  -- Estephania Yoon (Self) [phone] (H)          Alternative phone number: [phone]    Turnaround time given to caller:   \"This message will be sent to [state Provider's name]. The clinical team will fulfill your request as soon as they review your message.\"}        Services Department ([facility])    [address] [place] [phone]               Feb 20, 2017 10:00 AM CST   [facility] Inpatient with Kylee Renea    Services Department ([facility])    [address] [place] [phone]               Feb 20, 2017  4:30 PM CST   [facility] Inpatient with Lia Yu    Services Department ([facility])    [address] [place] [phone]               Feb 21, 2017 10:00 AM CST   [facility] Inpatient with Kylee Renea    Services Department ([facility])    [address] [place] [phone]               Feb 21, 2017  4:30 PM CST   [facility] Inpatient with Noelle Bridges    Services Department ([facility])    [address] [place] [phone]                 Further instructions from your care team       Behavioral Discharge Planning and Instructions      Summary:  You were admitted on 2/13/2017  For Suicidal Ideations.  You were treated by David Hjai DNP and discharged today from [facility].    Main Diagnosis:   1. Major Depressive Disorder, moderate, without psychotic future  2. Cluster B Traits  3. Hypothyroidism  4. Peptic Ulcer  5. Chronic neck and back pain    Health Care Follow-up Appointments:   Pscyhatrist/ Medication Management:  Ina Jang: Initial appointment  Friday February 24th 2017 at 1:00pm.   [facility] [place] [facility] [address] [place] [address]     [phone] Fax [phone]        Therapist:  AGUEDA Michelle: Initial appointment  Monday March 13th 2017 at 1:00pm. You are also on a cancellation wait list. Provider will call if they can  "get you in before 3/13/17 appointment.   [facility]  [address], [place] [address]    [phone] Fax [phone]    Attend all scheduled appointments with your outpatient providers. Call at least 24 hours in advance if you need to reschedule an appointment to ensure continued access to your outpatient providers.   Major Treatments, Procedures and Findings:  You were provided with: a psychiatric assessment, assessed for medical stability, medication evaluation and/or management, group therapy, milieu management and medical interventions    Symptoms to Report: feeling more aggressive, increased confusion, losing more sleep, mood getting worse or thoughts of suicide    Early warning signs can include: increased depression or anxiety sleep disturbances increased thoughts or behaviors of suicide or self-harm  increased unusual thinking, such as paranoia or hearing voices    Safety and Wellness:  Take all medicines as directed.  Make no changes unless your doctor suggests them.      Follow treatment recommendations.  Refrain from alcohol and non-prescribed drugs.  If there is a concern for safety, call 151.    Resources:   Crisis Intervention: 509.525.4299 or 870-570-4163 (TTY: 156.820.3738).  Call anytime for help.  National [place] on Mental Illness (www.mn.jose.org): 701.434.8311 or 426-246-7333.  Suicide Awareness Voices of Education (SAVE) (www.save.org): 489-750-HRKP (0080)  National Suicide Prevention Line (www.mentalhealthmn.org): 911-140-FWBG (6309)  Self- Management and Recovery Training., SMART-- Toll free: 509.266.6954  www.[employer]  Text 4 Life: txt \"LIFE\" to [address] for immediate support and crisis intervention  Crisis text line: Text \"START\" to 398-750. Free, confidential, 24/7.  Crisis Intervention: 505.678.7635 or 696-330-2305. Call anytime for help.   [facility] Crisis Team:  [phone]    The treatment team has appreciated the opportunity to " "work with you.     If you have any questions or concerns our unit number is [phone].  You may be receiving a follow-up phone call within the next three days from a representative from behavioral [employer].    You have identified the best phone number to reach you as [phone] .    Pending Results     No orders found from 2017 to 2017.            Admission Information     Date & Time Provider Department Dept. Phone    2017 Ruchi Faye MD [facility] [phone] [phone]      Your Vitals Were     Blood Pressure Pulse Temperature Respirations Pulse Oximetry       114/69 83 97.2  F (36.2  C) (Tympanic) 14 97%       MyChart Information     [facility] lets you send messages to your doctor, view your test results, renew your prescriptions, schedule appointments and more. To sign up, go to [email or web address] . Click on \"Log in\" on the left side of the screen, which will take you to the Welcome page. Then click on \"Sign up Now\" on the right side of the page.     You will be asked to enter the access code listed below, as well as some personal information. Please follow the directions to create your username and password.     Your access code is: [email or web address]  Expires: 2017  5:59 PM     Your access code will  in 90 days. If you need help or a new code, please call your [facility] clinic or [phone].        Care EveryWhere ID     This is your Care EveryWhere ID. This could be used by other organizations to access your [facility] medical records  [id number]           Review of your medicines      START taking        Dose / Directions    mirtazapine 15 MG tablet   Commonly known as:  REMERON        Dose:  15 mg   Take 1 tablet (15 mg) by mouth At Bedtime   Quantity:  30 tablet   Refills:  0       ranitidine 150 MG tablet   Commonly known as:  ZANTAC   Used for:  Ulcer (H)        Dose:  150 mg   Take 1 tablet (150 mg) by mouth At Bedtime   Quantity:  60 tablet   Refills:  0         CONTINUE these " medicines which may have CHANGED, or have new prescriptions. If we are uncertain of the size of tablets/capsules you have at home, strength may be listed as something that might have changed.        Dose / Directions    DULoxetine 60 MG EC capsule   Commonly known as:  CYMBALTA   This may have changed:    - medication strength  - how much to take  - when to take this        Dose:  60 mg   Take 1 capsule (60 mg) by mouth daily   Quantity:  30 capsule   Refills:  0       levothyroxine 50 MCG tablet   Commonly known as:  SYNTHROID/LEVOTHROID   This may have changed:    - medication strength  - how much to take  - when to take this   Used for:  Hypothyroidism, unspecified type        Dose:  50 mcg   Take 1 tablet (50 mcg) by mouth every morning (before breakfast)   Quantity:  30 tablet   Refills:  0       omeprazole 20 MG CR capsule   Commonly known as:  priLOSEC   This may have changed:    - medication strength  - how much to take  - when to take this   Used for:  Ulcer (H)        Dose:  20 mg   Take 1 capsule (20 mg) by mouth every morning (before breakfast)   Quantity:  30 capsule   Refills:  0         CONTINUE these medicines which have NOT CHANGED        Dose / Directions    acetaminophen 325 MG tablet   Commonly known as:  TYLENOL        Dose:  650 mg   Take 2 tablets (650 mg) by mouth every 4 hours as needed   Quantity:  40 tablet   Refills:  0       diclofenac 0.1 % ophthalmic solution   Commonly known as:  VOLTAREN        Use one drop in affected eye up to four times a day   Quantity:  10 mL   Refills:  1       methocarbamol 500 MG tablet   Commonly known as:  ROBAXIN        Dose:  500 mg   Take 1 tablet (500 mg) by mouth 4 times daily as needed for muscle spasms   Quantity:  40 tablet   Refills:  0         STOP taking     ibuprofen 200 MG tablet   Commonly known as:  ADVIL/MOTRIN           UNKNOWN TO PATIENT           ZOLOFT PO                Where to get your medicines      These medications were sent to  [facility] [place], [place] - [address]  [address], [facility] [address]     Phone:  [phone]     DULoxetine 60 MG EC capsule    levothyroxine 50 MCG tablet    mirtazapine 15 MG tablet    omeprazole 20 MG CR capsule    ranitidine 150 MG tablet                Protect others around you: Learn how to safely use, store and throw away your medicines at www.disposemymeds.org.             Medication List: This is a list of all your medications and when to take them. Check marks below indicate your daily home schedule. Keep this list as a reference.      Medications           Morning Afternoon Evening Bedtime As Needed    acetaminophen 325 MG tablet   Commonly known as:  TYLENOL   Take 2 tablets (650 mg) by mouth every 4 hours as needed   Last time this was given:  650 mg on 2/18/2017  8:53 AM                                   diclofenac 0.1 % ophthalmic solution   Commonly known as:  VOLTAREN   Use one drop in affected eye up to four times a day                                            DULoxetine 60 MG EC capsule   Commonly known as:  CYMBALTA   Take 1 capsule (60 mg) by mouth daily   Last time this was given:  60 mg on 2/18/2017  8:49 AM                                   levothyroxine 50 MCG tablet   Commonly known as:  SYNTHROID/LEVOTHROID   Take 1 tablet (50 mcg) by mouth every morning (before breakfast)   Last time this was given:  50 mcg on 2/18/2017  6:55 AM                                   methocarbamol 500 MG tablet   Commonly known as:  ROBAXIN   Take 1 tablet (500 mg) by mouth 4 times daily as needed for muscle spasms                                   mirtazapine 15 MG tablet   Commonly known as:  REMERON   Take 1 tablet (15 mg) by mouth At Bedtime   Last time this was given:  15 mg on 2/17/2017  9:16 PM                                   omeprazole 20 MG CR capsule   Commonly known as:  priLOSEC   Take 1 capsule (20 mg) by mouth every morning (before breakfast)   Last  time this was given:  20 mg on 2/18/2017  6:55 AM                                   ranitidine 150 MG tablet   Commonly known as:  ZANTAC   Take 1 tablet (150 mg) by mouth At Bedtime   Last time this was given:  150 mg on 2/17/2017  9:16 PM

## 2020-12-01 ENCOUNTER — APPOINTMENT (OUTPATIENT)
Dept: GENERAL RADIOLOGY | Facility: CLINIC | Age: 60
End: 2020-12-01
Attending: EMERGENCY MEDICINE

## 2020-12-01 ENCOUNTER — HOSPITAL ENCOUNTER (EMERGENCY)
Facility: CLINIC | Age: 60
Discharge: HOME | End: 2020-12-01
Attending: EMERGENCY MEDICINE | Admitting: EMERGENCY MEDICINE

## 2020-12-01 ENCOUNTER — ALLIED HEALTH/NURSE VISIT (OUTPATIENT)
Dept: INTERPRETER SERVICES | Facility: CLINIC | Age: 60
End: 2020-12-01

## 2020-12-01 VITALS
HEART RATE: 73 BPM | TEMPERATURE: 98.3 F | SYSTOLIC BLOOD PRESSURE: 136 MMHG | DIASTOLIC BLOOD PRESSURE: 86 MMHG | RESPIRATION RATE: 20 BRPM | OXYGEN SATURATION: 98 %

## 2020-12-01 DIAGNOSIS — Z20.822 SUSPECTED COVID-19 VIRUS INFECTION: ICD-10-CM

## 2020-12-01 PROCEDURE — C9803 HOPD COVID-19 SPEC COLLECT: HCPCS

## 2020-12-01 PROCEDURE — 99284 EMERGENCY DEPT VISIT MOD MDM: CPT | Mod: 25

## 2020-12-01 PROCEDURE — T1013 SIGN LANG/ORAL INTERPRETER: HCPCS | Mod: U4,TEL

## 2020-12-01 PROCEDURE — U0003 INFECTIOUS AGENT DETECTION BY NUCLEIC ACID (DNA OR RNA); SEVERE ACUTE RESPIRATORY SYNDROME CORONAVIRUS 2 (SARS-COV-2) (CORONAVIRUS DISEASE [COVID-19]), AMPLIFIED PROBE TECHNIQUE, MAKING USE OF HIGH THROUGHPUT TECHNOLOGIES AS DESCRIBED BY CMS-2020-01-R: HCPCS | Performed by: EMERGENCY MEDICINE

## 2020-12-01 PROCEDURE — 71045 X-RAY EXAM CHEST 1 VIEW: CPT

## 2020-12-01 RX ORDER — ACETAMINOPHEN 500 MG
1000 TABLET ORAL EVERY 6 HOURS PRN
Qty: 30 TABLET | Refills: 0 | Status: SHIPPED | OUTPATIENT
Start: 2020-12-01 | End: 2020-12-08

## 2020-12-01 ASSESSMENT — ENCOUNTER SYMPTOMS
FATIGUE: 1
WEAKNESS: 1
COUGH: 1
FEVER: 1
MYALGIAS: 1

## 2020-12-01 NOTE — ED AVS SNAPSHOT
[facility]  [address]  [facility] [address]  Phone: [phone]  Fax: [phone]                                    Kayla Pierce   MRN: [id number]    Department: [facility]   Date of Visit: 12/1/2020           After Visit Summary Signature Page    I have received my discharge instructions, and my questions have been answered. I have discussed any challenges I see with this plan with the nurse or doctor.    ..........................................................................................................................................  Patient/Patient Representative Signature      ..........................................................................................................................................  Patient Representative Print Name and Relationship to Patient    ..................................................               ................................................  Date                                   Time    ..........................................................................................................................................  Reviewed by Signature/Title    ...................................................              ..............................................  Date                                               Time          22EPIC Rev 08/18

## 2020-12-01 NOTE — ED TRIAGE NOTES
Fever, cough, body aches and shortness of breath as well as chest pain. Symptoms started about 1 week ago.

## 2020-12-01 NOTE — ED PROVIDER NOTES
History     Chief Complaint:  Cough       The history is provided by the patient. The history is limited by a language barrier. A  was used.     Kayla Pierce is a 59 year old year old female who presents for evaluation of cough. The patient states that for the past week she has had a dry cough, fever, body aches, fatigue, and weakness. About a month and a half ago she states she had similar symptoms but was able to resolve them with Tylenol and she reports antibiotics. She has she has been taking Tylenol for her symptoms. She says she has chest and back pain when she coughs though none currently.  She denies any dyspnea. Known COVID 19 residents in her apartment building.       Allergies:  Amoxicillin  Ibuprofen   Gadobutrol      Medications:   Cymbalta  Levothyroxine  Methocarbamol  Remeron  Omeprazole  Ranitidine      Medical History:   Depression  GERD  Hypothyroid  Ulcer  Hearing loss  Insomnia  Cervical stenosis of spinal canal  Calculus of gallbladder      Surgical History:  Cholecystectomy  C5-C7 cervical disc replacement  Cataract removal      Family History:   Family history reviewed. No pertinent family history.      Social History:  Smoking Status: Negative   Smokeless Tobacco: Negative   Alcohol Use: Negative   Drug Use: Negative   Primary Physician: [facility], [place]       Review of Systems   Constitutional: Positive for fatigue and fever.   Respiratory: Positive for cough.    Musculoskeletal: Positive for myalgias.   Neurological: Positive for weakness.   All other systems reviewed and are negative.    Physical Exam     Patient Vitals for the past 24 hrs:   BP Temp Pulse Resp SpO2   12/01/20 1306 136/86 98.3  F (36.8  C) 73 20 98 %          Physical Exam  Nursing note and vitals reviewed.  Constitutional: Well nourished. Resting comfortably.   Eyes: Conjunctiva normal.  Pupils are equal, round, and reactive to light.   ENT: Nose normal. Mucous membranes pink  and moist.    Neck: Normal range of motion.  CVS: Normal rate, regular rhythm.  Normal heart sounds.  No murmur.  Pulmonary: Lungs clear to auscultation bilaterally. No wheezes/rales/rhonchi.  GI: Abdomen soft. Nontender, nondistended. No rigidity or guarding.    MSK: No calf tenderness or swelling.  Neuro: Alert. Follows simple commands.  Skin: Skin is warm and dry. No rash noted.   Psychiatric: Normal affect.       Emergency Department Course     Imaging:  Radiology results were communicated with the patient who voiced understanding of the findings.    XR Chest Port 1 View  Negative chest. Lungs clear.    CHANG BERGMAN MD    Reading per radiology     Laboratory:  Laboratory findings were communicated with the patient who voiced understanding of the findings.    COVID-19 Virus (Coronavirus), PCR NP Swab: pending        Emergency Department Course:    1319 Nursing notes and vitals reviewed.    1333 I performed an exam of the patient as documented above.     1344 The patient was sent for XR while in the emergency department, results above.     1406 Findings and plan explained to the Patient. Patient discharged home with instructions regarding supportive care, medications, and reasons to return. The importance of close follow-up was reviewed. The patient was prescribed as below.    1424 A nares sample was obtained for laboratory testing as documented above.    Impression & Plan     Covid-19  Kayla Pierce was evaluated during a global COVID-19 pandemic, which necessitated consideration that the patient might be at risk for infection with the SARS-CoV-2 virus that causes COVID-19.   Applicable protocols for evaluation were followed during the patient's care.   COVID-19 was considered as part of the patient's evaluation. The plan for testing is:  a test was obtained during this visit.    Medical Decision Making:  Patient is a 59-year-old female presenting with cough fatigue amongst other complaints.   She is nontoxic, no significant distress on arrival.  She denies any active chest pain.  She is overall not septic appearing nor in significant respiratory distress.  I do not feel emergent labs are indicated at this time and she is comfortable deferring.  Chest x-ray without focal pneumonia, fluid overload, widened mediastinum or pneumothorax.  She was tested for COVID-19 today given my concern that her symptoms are secondary to Covid.  She was made aware of need for quarantine pending testing results.  She ambulates without hypoxia.  I did recommend supportive care at this point in time with Tylenol as needed and to return to the ED for worsening dyspnea, chest pain or should symptoms worsen.  Patient feels comfortable with plan of care.  All questions addressed.  Entire interview and discharge planning was discussed using an .        Diagnosis:     ICD-10-CM    1. Suspected COVID-19 virus infection  Z20.828         Disposition:  Discharged to home.    Discharge Medications:  New Prescriptions    ACETAMINOPHEN (TYLENOL) 500 MG TABLET    Take 2 tablets (1,000 mg) by mouth every 6 hours as needed for mild pain       Scribe Disclosure:  Chasidy GATES, am serving as a scribe at 1:21 PM on 12/1/2020 to document services personally performed by Shania Lopez DO based on my observations and the provider's statements to me.      Shania Lopez DO  12/01/20 2046

## 2020-12-02 LAB
SARS-COV-2 RNA SPEC QL NAA+PROBE: NOT DETECTED
SPECIMEN SOURCE: NORMAL

## 2020-12-23 ENCOUNTER — APPOINTMENT (OUTPATIENT)
Dept: INTERPRETER SERVICES | Facility: CLINIC | Age: 60
End: 2020-12-23

## 2022-11-01 ENCOUNTER — HOSPITAL ENCOUNTER (EMERGENCY)
Facility: CLINIC | Age: 62
Discharge: HOME | End: 2022-11-02
Attending: EMERGENCY MEDICINE | Admitting: EMERGENCY MEDICINE

## 2022-11-01 ENCOUNTER — APPOINTMENT (OUTPATIENT)
Dept: CT IMAGING | Facility: CLINIC | Age: 62
End: 2022-11-01
Attending: EMERGENCY MEDICINE

## 2022-11-01 VITALS
OXYGEN SATURATION: 99 % | SYSTOLIC BLOOD PRESSURE: 129 MMHG | TEMPERATURE: 98.1 F | WEIGHT: 165 LBS | DIASTOLIC BLOOD PRESSURE: 75 MMHG | RESPIRATION RATE: 18 BRPM | HEART RATE: 67 BPM

## 2022-11-01 DIAGNOSIS — J02.0 STREPTOCOCCAL PHARYNGITIS: ICD-10-CM

## 2022-11-01 DIAGNOSIS — Z98.890 CHRONIC NECK PAIN WITH HISTORY OF CERVICAL SPINAL SURGERY: ICD-10-CM

## 2022-11-01 DIAGNOSIS — M54.2 CHRONIC NECK PAIN WITH HISTORY OF CERVICAL SPINAL SURGERY: ICD-10-CM

## 2022-11-01 DIAGNOSIS — G89.28 CHRONIC NECK PAIN WITH HISTORY OF CERVICAL SPINAL SURGERY: ICD-10-CM

## 2022-11-01 LAB
ANION GAP SERPL CALCULATED.3IONS-SCNC: 13 MMOL/L (ref 7–15)
BASOPHILS # BLD AUTO: 0.1 10E3/UL (ref 0–0.2)
BASOPHILS NFR BLD AUTO: 1 %
BUN SERPL-MCNC: 10.9 MG/DL (ref 8–23)
CALCIUM SERPL-MCNC: 9.6 MG/DL (ref 8.8–10.2)
CHLORIDE SERPL-SCNC: 101 MMOL/L (ref 98–107)
CREAT SERPL-MCNC: 0.6 MG/DL (ref 0.51–0.95)
DEPRECATED HCO3 PLAS-SCNC: 26 MMOL/L (ref 22–29)
DEPRECATED S PYO AG THROAT QL EIA: POSITIVE
EOSINOPHIL # BLD AUTO: 0.1 10E3/UL (ref 0–0.7)
EOSINOPHIL NFR BLD AUTO: 1 %
ERYTHROCYTE [DISTWIDTH] IN BLOOD BY AUTOMATED COUNT: 13.4 % (ref 10–15)
FLUAV RNA SPEC QL NAA+PROBE: NEGATIVE
FLUBV RNA RESP QL NAA+PROBE: NEGATIVE
GFR SERPL CREATININE-BSD FRML MDRD: >90 ML/MIN/1.73M2
GLUCOSE SERPL-MCNC: 86 MG/DL (ref 70–99)
HCT VFR BLD AUTO: 45.1 % (ref 35–47)
HGB BLD-MCNC: 14.1 G/DL (ref 11.7–15.7)
HOLD SPECIMEN: NORMAL
IMM GRANULOCYTES # BLD: 0 10E3/UL
IMM GRANULOCYTES NFR BLD: 0 %
LYMPHOCYTES # BLD AUTO: 3 10E3/UL (ref 0.8–5.3)
LYMPHOCYTES NFR BLD AUTO: 34 %
MCH RBC QN AUTO: 28.1 PG (ref 26.5–33)
MCHC RBC AUTO-ENTMCNC: 31.3 G/DL (ref 31.5–36.5)
MCV RBC AUTO: 90 FL (ref 78–100)
MONOCYTES # BLD AUTO: 0.4 10E3/UL (ref 0–1.3)
MONOCYTES NFR BLD AUTO: 5 %
NEUTROPHILS # BLD AUTO: 5.3 10E3/UL (ref 1.6–8.3)
NEUTROPHILS NFR BLD AUTO: 59 %
NRBC # BLD AUTO: 0 10E3/UL
NRBC BLD AUTO-RTO: 0 /100
PLATELET # BLD AUTO: 288 10E3/UL (ref 150–450)
POTASSIUM SERPL-SCNC: 4.1 MMOL/L (ref 3.4–5.3)
RBC # BLD AUTO: 5.01 10E6/UL (ref 3.8–5.2)
RSV RNA SPEC NAA+PROBE: NEGATIVE
SARS-COV-2 RNA RESP QL NAA+PROBE: NEGATIVE
SODIUM SERPL-SCNC: 140 MMOL/L (ref 136–145)
WBC # BLD AUTO: 8.8 10E3/UL (ref 4–11)

## 2022-11-01 PROCEDURE — 36415 COLL VENOUS BLD VENIPUNCTURE: CPT | Performed by: EMERGENCY MEDICINE

## 2022-11-01 PROCEDURE — 87637 SARSCOV2&INF A&B&RSV AMP PRB: CPT | Performed by: EMERGENCY MEDICINE

## 2022-11-01 PROCEDURE — 99285 EMERGENCY DEPT VISIT HI MDM: CPT | Mod: 25

## 2022-11-01 PROCEDURE — 250N000009 HC RX 250: Performed by: EMERGENCY MEDICINE

## 2022-11-01 PROCEDURE — C9803 HOPD COVID-19 SPEC COLLECT: HCPCS

## 2022-11-01 PROCEDURE — 70491 CT SOFT TISSUE NECK W/DYE: CPT

## 2022-11-01 PROCEDURE — 80048 BASIC METABOLIC PNL TOTAL CA: CPT | Performed by: EMERGENCY MEDICINE

## 2022-11-01 PROCEDURE — 87880 STREP A ASSAY W/OPTIC: CPT | Performed by: EMERGENCY MEDICINE

## 2022-11-01 PROCEDURE — 85025 COMPLETE CBC W/AUTO DIFF WBC: CPT | Performed by: EMERGENCY MEDICINE

## 2022-11-01 PROCEDURE — 250N000011 HC RX IP 250 OP 636: Performed by: EMERGENCY MEDICINE

## 2022-11-01 RX ORDER — IOPAMIDOL 755 MG/ML
120 INJECTION, SOLUTION INTRAVASCULAR ONCE
Status: COMPLETED | OUTPATIENT
Start: 2022-11-01 | End: 2022-11-01

## 2022-11-01 RX ORDER — HYDROCODONE BITARTRATE AND ACETAMINOPHEN 5; 325 MG/1; MG/1
1 TABLET ORAL ONCE
Status: DISCONTINUED | OUTPATIENT
Start: 2022-11-01 | End: 2022-11-02 | Stop reason: HOSPADM

## 2022-11-01 RX ADMIN — SODIUM CHLORIDE 65 ML: 9 INJECTION, SOLUTION INTRAVENOUS at 21:32

## 2022-11-01 RX ADMIN — IOPAMIDOL 90 ML: 755 INJECTION, SOLUTION INTRAVENOUS at 21:32

## 2022-11-01 ASSESSMENT — ENCOUNTER SYMPTOMS
WEAKNESS: 0
COUGH: 1
FEVER: 0
SORE THROAT: 1
NUMBNESS: 0
CHILLS: 0

## 2022-11-01 ASSESSMENT — ACTIVITIES OF DAILY LIVING (ADL)
ADLS_ACUITY_SCORE: 35
ADLS_ACUITY_SCORE: 37

## 2022-11-01 NOTE — ED TRIAGE NOTES
"Pt presents for evaluation of coughing up yellow sputum or what pt describes as \"discharge\". Feels neck tightness when this happens. Hx of cervical disc (C5-C7) surgery 3 years ago. Feels like neck twists/turns/leans to a side and feels like this is an internal issue. Notes having difficulty swallowing and eating due to neck issue. Requesting an xray to check neck.       "

## 2022-11-02 ENCOUNTER — APPOINTMENT (OUTPATIENT)
Dept: MRI IMAGING | Facility: CLINIC | Age: 62
End: 2022-11-02
Attending: EMERGENCY MEDICINE

## 2022-11-02 PROCEDURE — 72141 MRI NECK SPINE W/O DYE: CPT

## 2022-11-02 RX ORDER — CEPHALEXIN 500 MG/1
500 CAPSULE ORAL 4 TIMES DAILY
Qty: 28 CAPSULE | Refills: 0 | Status: SHIPPED | OUTPATIENT
Start: 2022-11-02 | End: 2022-11-09

## 2022-11-02 ASSESSMENT — ACTIVITIES OF DAILY LIVING (ADL): ADLS_ACUITY_SCORE: 37
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
